# Patient Record
Sex: FEMALE | Race: WHITE | Employment: FULL TIME | URBAN - METROPOLITAN AREA
[De-identification: names, ages, dates, MRNs, and addresses within clinical notes are randomized per-mention and may not be internally consistent; named-entity substitution may affect disease eponyms.]

---

## 2021-10-23 ENCOUNTER — HOSPITAL ENCOUNTER (EMERGENCY)
Age: 36
Discharge: HOME | End: 2021-10-23
Attending: EMERGENCY MEDICINE
Payer: COMMERCIAL

## 2021-10-23 VITALS
DIASTOLIC BLOOD PRESSURE: 94 MMHG | TEMPERATURE: 97.8 F | WEIGHT: 230 LBS | BODY MASS INDEX: 34.97 KG/M2 | RESPIRATION RATE: 16 BRPM | HEART RATE: 88 BPM | SYSTOLIC BLOOD PRESSURE: 140 MMHG | OXYGEN SATURATION: 98 %

## 2021-10-23 DIAGNOSIS — H69.83 DYSFUNCTION OF BOTH EUSTACHIAN TUBES: ICD-10-CM

## 2021-10-23 DIAGNOSIS — H60.391 INFECTIVE OTITIS EXTERNA OF RIGHT EAR: Primary | ICD-10-CM

## 2021-10-23 PROCEDURE — 6360000002 HC RX W HCPCS: Performed by: EMERGENCY MEDICINE

## 2021-10-23 PROCEDURE — 99282 EMERGENCY DEPT VISIT SF MDM: CPT

## 2021-10-23 PROCEDURE — 96372 THER/PROPH/DIAG INJ SC/IM: CPT

## 2021-10-23 RX ORDER — ATORVASTATIN CALCIUM 20 MG/1
20 TABLET, FILM COATED ORAL DAILY
COMMUNITY

## 2021-10-23 RX ORDER — DEXAMETHASONE SODIUM PHOSPHATE 10 MG/ML
10 INJECTION, SOLUTION INTRAMUSCULAR; INTRAVENOUS ONCE
Status: COMPLETED | OUTPATIENT
Start: 2021-10-23 | End: 2021-10-23

## 2021-10-23 RX ORDER — AMLODIPINE BESYLATE 5 MG/1
5 TABLET ORAL DAILY
COMMUNITY

## 2021-10-23 RX ORDER — SULFAMETHOXAZOLE AND TRIMETHOPRIM 800; 160 MG/1; MG/1
1 TABLET ORAL 2 TIMES DAILY
Qty: 20 TABLET | Refills: 0 | Status: SHIPPED | OUTPATIENT
Start: 2021-10-23 | End: 2021-11-02

## 2021-10-23 RX ORDER — PROPRANOLOL HYDROCHLORIDE 120 MG/1
120 CAPSULE, EXTENDED RELEASE ORAL DAILY
COMMUNITY

## 2021-10-23 RX ORDER — DULOXETIN HYDROCHLORIDE 60 MG/1
60 CAPSULE, DELAYED RELEASE ORAL 2 TIMES DAILY
COMMUNITY

## 2021-10-23 RX ORDER — LISINOPRIL 20 MG/1
20 TABLET ORAL DAILY
COMMUNITY

## 2021-10-23 RX ORDER — NAPROXEN 500 MG/1
500 TABLET ORAL 2 TIMES DAILY
Qty: 14 TABLET | Refills: 0 | Status: SHIPPED | OUTPATIENT
Start: 2021-10-23 | End: 2021-10-30

## 2021-10-23 RX ADMIN — DEXAMETHASONE SODIUM PHOSPHATE 10 MG: 10 INJECTION, SOLUTION INTRAMUSCULAR; INTRAVENOUS at 18:36

## 2021-10-23 ASSESSMENT — PAIN DESCRIPTION - FREQUENCY: FREQUENCY: CONTINUOUS

## 2021-10-23 ASSESSMENT — ENCOUNTER SYMPTOMS
VOMITING: 0
EYE REDNESS: 0
NAUSEA: 0
EYE PAIN: 0
DIARRHEA: 0
BACK PAIN: 0
COUGH: 0
ABDOMINAL DISTENTION: 0
SHORTNESS OF BREATH: 0
EYE DISCHARGE: 0
SINUS PRESSURE: 0
SORE THROAT: 0
WHEEZING: 0
RHINORRHEA: 1

## 2021-10-23 ASSESSMENT — PAIN DESCRIPTION - ORIENTATION: ORIENTATION: RIGHT;LEFT

## 2021-10-23 ASSESSMENT — PAIN SCALES - GENERAL: PAINLEVEL_OUTOF10: 9

## 2021-10-23 ASSESSMENT — PAIN DESCRIPTION - PAIN TYPE: TYPE: ACUTE PAIN

## 2021-10-23 ASSESSMENT — PAIN DESCRIPTION - DESCRIPTORS: DESCRIPTORS: ACHING

## 2021-10-23 ASSESSMENT — PAIN DESCRIPTION - LOCATION: LOCATION: EAR

## 2021-10-23 NOTE — ED PROVIDER NOTES
9 day history of bilateral ear pain, MLP at her PCP office told her to use OTC Claritan and Sudafed; We discussed the use of Sudafed concurrently with hypertension diagnosis; She is a regular user of QTIPS to clean her ears    The history is provided by the patient and the spouse. Ear Problem  Location:  Bilateral  Behind ear:  No abnormality  Quality:  Aching and burning  Severity:  Mild  Onset quality:  Gradual  Timing:  Constant  Progression:  Worsening  Chronicity:  Recurrent  Context: water in ear    Relieved by:  Nothing  Worsened by:  Palpation, swallowing and position  Ineffective treatments:  None tried  Associated symptoms: congestion and rhinorrhea    Associated symptoms: no cough, no diarrhea, no fever, no headaches, no rash, no sore throat and no vomiting    Risk factors: chronic ear infection         Review of Systems   Constitutional: Positive for activity change. Negative for chills and fever. HENT: Positive for congestion, ear pain and rhinorrhea. Negative for sinus pressure and sore throat. Eyes: Negative for pain, discharge and redness. Respiratory: Negative for cough, shortness of breath and wheezing. Cardiovascular: Negative for chest pain. Gastrointestinal: Negative for abdominal distention, diarrhea, nausea and vomiting. Genitourinary: Negative for dysuria and frequency. Musculoskeletal: Negative for arthralgias and back pain. Skin: Negative for rash and wound. Neurological: Negative for weakness and headaches. Hematological: Negative for adenopathy. Psychiatric/Behavioral: Negative. All other systems reviewed and are negative. Physical Exam  Vitals and nursing note reviewed. Constitutional:       Appearance: She is well-developed. HENT:      Head: Normocephalic and atraumatic. Right Ear: Drainage, swelling and tenderness present. A middle ear effusion is present. Left Ear: A middle ear effusion is present.       Nose: Congestion and rhinorrhea present. Eyes:      Pupils: Pupils are equal, round, and reactive to light. Cardiovascular:      Rate and Rhythm: Normal rate and regular rhythm. Heart sounds: Normal heart sounds. No murmur heard. Pulmonary:      Effort: Pulmonary effort is normal.      Breath sounds: Normal breath sounds. Abdominal:      General: Bowel sounds are normal.      Palpations: Abdomen is soft. Tenderness: There is no abdominal tenderness. There is no guarding or rebound. Musculoskeletal:      Cervical back: Normal range of motion and neck supple. Skin:     General: Skin is warm and dry. Neurological:      Mental Status: She is alert and oriented to person, place, and time. Psychiatric:         Behavior: Behavior normal.         Thought Content: Thought content normal.         Judgment: Judgment normal.        --------------------------------------------- PAST HISTORY ---------------------------------------------  Past Medical History:  has a past medical history of Anxiety, Diabetes mellitus ([facility] [address] Fibromyalgia, Headache, Hyperlipidemia, Hypertension, and Obesity. Past Surgical History:  has a past surgical history that includes  section (, , ); Tubal ligation (); Hysterectomy (); bladder repair (); and Cholecystectomy (). Social History:  reports that she has never smoked. She does not have any smokeless tobacco history on file. She reports current alcohol use. She reports that she does not use drugs. Family History: family history includes Arthritis in her father and mother; Asthma in her brother; Cancer in her paternal grandfather; Diabetes in her maternal grandmother; Heart Disease in her father; High Blood Pressure in her maternal grandmother; Kidney Disease in her father; Other in her paternal grandmother. The patients home medications have been reviewed.     Allergies: Morphine, Reglan [metoclopramide], Cefdinir, and Compazine [prochlorperazine maleate]    -------------------------------------------------- RESULTS -------------------------------------------------  No results found for this visit on 10/23/21. No orders to display       ------------------------- NURSING NOTES AND VITALS REVIEWED ---------------------------   The nursing notes within the ED encounter and vital signs as below have been reviewed. BP (!) 140/94   Pulse 88   Temp 97.8 °F (36.6 °C) (Temporal)   Resp 16   Wt 230 lb (104.3 kg)   SpO2 98%   BMI 34.97 kg/m²   Oxygen Saturation Interpretation: Normal      ------------------------------------------ PROGRESS NOTES ------------------------------------------   I have spoken with the patient and discussed todays results, in addition to providing specific details for the plan of care and counseling regarding the diagnosis and prognosis. Their questions are answered at this time and they are agreeable with the plan.      --------------------------------- ADDITIONAL PROVIDER NOTES ---------------------------------        This patient is stable for discharge. I have shared the specific conditions for return, as well as the importance of follow-up. IMPRESSION:     1. Infective otitis externa of right ear    2.  Dysfunction of both eustachian tubes      Discharge Medication List as of 10/23/2021  6:35 PM      START taking these medications    Details   neomycin-polymyxin-hydrocortisone (CORTISPORIN) 3.5-87735-2 otic solution Place 4 drops into the right ear 4 times daily for 10 days, Disp-10 mL, R-0Print      sulfamethoxazole-trimethoprim (BACTRIM DS) 800-160 MG per tablet Take 1 tablet by mouth 2 times daily for 10 days, Disp-20 tablet, R-0Print         CONTINUE these medications which have CHANGED    Details   naproxen (NAPROSYN) 500 MG tablet Take 1 tablet by mouth 2 times daily for 7 days, Disp-14 tablet, R-0Print         CONTINUE these medications which have NOT CHANGED    Details   DULoxetine (CYMBALTA) 60

## 2023-03-16 DIAGNOSIS — I10 HYPERTENSION, UNSPECIFIED TYPE: Primary | ICD-10-CM

## 2023-03-16 RX ORDER — LOSARTAN POTASSIUM 100 MG/1
TABLET ORAL
Qty: 30 TABLET | Refills: 5 | Status: SHIPPED | OUTPATIENT
Start: 2023-03-16 | End: 2023-09-19 | Stop reason: SDUPTHER

## 2023-05-04 ENCOUNTER — APPOINTMENT (OUTPATIENT)
Dept: PRIMARY CARE | Facility: CLINIC | Age: 38
End: 2023-05-04
Payer: COMMERCIAL

## 2023-06-14 ENCOUNTER — TELEPHONE (OUTPATIENT)
Dept: PRIMARY CARE | Facility: CLINIC | Age: 38
End: 2023-06-14
Payer: COMMERCIAL

## 2023-06-14 DIAGNOSIS — I10 BENIGN ESSENTIAL HYPERTENSION: Primary | ICD-10-CM

## 2023-06-14 RX ORDER — AMLODIPINE BESYLATE 5 MG/1
5 TABLET ORAL DAILY
COMMUNITY
End: 2023-06-14 | Stop reason: SDUPTHER

## 2023-06-14 RX ORDER — AMLODIPINE BESYLATE 5 MG/1
5 TABLET ORAL DAILY
Qty: 90 TABLET | Refills: 3 | Status: SHIPPED | OUTPATIENT
Start: 2023-06-14 | End: 2023-11-28 | Stop reason: SDUPTHER

## 2023-06-17 DIAGNOSIS — E11.9 TYPE 2 DIABETES MELLITUS WITHOUT COMPLICATION, WITH LONG-TERM CURRENT USE OF INSULIN (MULTI): Primary | ICD-10-CM

## 2023-06-17 DIAGNOSIS — Z79.4 TYPE 2 DIABETES MELLITUS WITHOUT COMPLICATION, WITH LONG-TERM CURRENT USE OF INSULIN (MULTI): Primary | ICD-10-CM

## 2023-06-21 RX ORDER — CALCIUM CITRATE/VITAMIN D3 200MG-6.25
TABLET ORAL
Qty: 100 STRIP | Refills: 5 | Status: SHIPPED | OUTPATIENT
Start: 2023-06-21

## 2023-07-18 ENCOUNTER — HOSPITAL ENCOUNTER (OUTPATIENT)
Dept: DATA CONVERSION | Facility: HOSPITAL | Age: 38
End: 2023-07-18
Attending: INTERNAL MEDICINE | Admitting: INTERNAL MEDICINE
Payer: COMMERCIAL

## 2023-07-18 DIAGNOSIS — K57.30 DIVERTICULOSIS OF LARGE INTESTINE WITHOUT PERFORATION OR ABSCESS WITHOUT BLEEDING: ICD-10-CM

## 2023-07-18 DIAGNOSIS — R19.4 CHANGE IN BOWEL HABIT: ICD-10-CM

## 2023-07-18 DIAGNOSIS — R10.30 LOWER ABDOMINAL PAIN, UNSPECIFIED: ICD-10-CM

## 2023-07-18 DIAGNOSIS — K29.50 UNSPECIFIED CHRONIC GASTRITIS WITHOUT BLEEDING: ICD-10-CM

## 2023-07-18 DIAGNOSIS — K64.9 UNSPECIFIED HEMORRHOIDS: ICD-10-CM

## 2023-07-18 DIAGNOSIS — R10.13 EPIGASTRIC PAIN: ICD-10-CM

## 2023-07-18 DIAGNOSIS — K21.9 GASTRO-ESOPHAGEAL REFLUX DISEASE WITHOUT ESOPHAGITIS: ICD-10-CM

## 2023-07-18 DIAGNOSIS — K59.00 CONSTIPATION, UNSPECIFIED: ICD-10-CM

## 2023-07-21 ENCOUNTER — TELEPHONE (OUTPATIENT)
Dept: PRIMARY CARE | Facility: CLINIC | Age: 38
End: 2023-07-21
Payer: COMMERCIAL

## 2023-07-21 DIAGNOSIS — F41.9 ANXIETY: ICD-10-CM

## 2023-07-21 DIAGNOSIS — I10 BENIGN ESSENTIAL HYPERTENSION: ICD-10-CM

## 2023-07-21 RX ORDER — PROPRANOLOL HYDROCHLORIDE 160 MG/1
CAPSULE, EXTENDED RELEASE ORAL
Qty: 30 CAPSULE | Refills: 11 | Status: SHIPPED | OUTPATIENT
Start: 2023-07-21 | End: 2023-11-28 | Stop reason: SDUPTHER

## 2023-07-21 RX ORDER — DULOXETIN HYDROCHLORIDE 60 MG/1
CAPSULE, DELAYED RELEASE ORAL
Qty: 60 CAPSULE | Refills: 11 | Status: SHIPPED | OUTPATIENT
Start: 2023-07-21 | End: 2023-11-28 | Stop reason: SDUPTHER

## 2023-07-21 NOTE — TELEPHONE ENCOUNTER
Duloxetine   And propanolol   [facility] [facility]   Has been out for 2 days   Says she called on Monday but I don't see it documented     [phone]

## 2023-07-24 LAB
COMPLETE PATHOLOGY REPORT: NORMAL
CONVERTED CLINICAL DIAGNOSIS-HISTORY: NORMAL
CONVERTED FINAL DIAGNOSIS: NORMAL
CONVERTED FINAL REPORT PDF LINK TO COPY AND PASTE: NORMAL
CONVERTED GROSS DESCRIPTION: NORMAL
CONVERTED MICROSCOPIC DESCRIPTION: NORMAL

## 2023-09-19 DIAGNOSIS — I10 HYPERTENSION, UNSPECIFIED TYPE: ICD-10-CM

## 2023-09-19 RX ORDER — LOSARTAN POTASSIUM 100 MG/1
TABLET ORAL
Qty: 30 TABLET | Refills: 5 | Status: SHIPPED | OUTPATIENT
Start: 2023-09-19 | End: 2024-02-02 | Stop reason: SDUPTHER

## 2023-09-29 VITALS — BODY MASS INDEX: 35.47 KG/M2 | HEIGHT: 67 IN | WEIGHT: 225.97 LBS

## 2023-10-05 ENCOUNTER — HOSPITAL ENCOUNTER (OUTPATIENT)
Dept: RADIOLOGY | Facility: HOSPITAL | Age: 38
Discharge: HOME | End: 2023-10-05
Payer: COMMERCIAL

## 2023-10-05 DIAGNOSIS — N64.59 OTHER SIGNS AND SYMPTOMS IN BREAST: ICD-10-CM

## 2023-10-05 PROCEDURE — 77061 BREAST TOMOSYNTHESIS UNI: CPT | Mod: RT

## 2023-10-05 PROCEDURE — 77065 DX MAMMO INCL CAD UNI: CPT | Mod: RIGHT SIDE

## 2023-10-05 PROCEDURE — G0279 TOMOSYNTHESIS, MAMMO: HCPCS | Mod: RIGHT SIDE

## 2023-10-06 DIAGNOSIS — E11.9 TYPE 2 DIABETES MELLITUS WITHOUT COMPLICATION, UNSPECIFIED WHETHER LONG TERM INSULIN USE (MULTI): Primary | ICD-10-CM

## 2023-10-06 RX ORDER — TIRZEPATIDE 12.5 MG/.5ML
12.5 INJECTION, SOLUTION SUBCUTANEOUS
Qty: 2 ML | Refills: 3 | Status: SHIPPED | OUTPATIENT
Start: 2023-10-06 | End: 2023-12-12 | Stop reason: ALTCHOICE

## 2023-10-06 RX ORDER — TIRZEPATIDE 12.5 MG/.5ML
INJECTION, SOLUTION SUBCUTANEOUS
COMMUNITY
Start: 2023-09-05 | End: 2023-10-06 | Stop reason: SDUPTHER

## 2023-10-29 ENCOUNTER — HOSPITAL ENCOUNTER (EMERGENCY)
Age: 38
Discharge: HOME | End: 2023-10-29
Attending: FAMILY MEDICINE
Payer: COMMERCIAL

## 2023-10-29 VITALS
RESPIRATION RATE: 16 BRPM | WEIGHT: 225 LBS | TEMPERATURE: 98.6 F | BODY MASS INDEX: 34.1 KG/M2 | HEART RATE: 96 BPM | DIASTOLIC BLOOD PRESSURE: 93 MMHG | OXYGEN SATURATION: 99 % | SYSTOLIC BLOOD PRESSURE: 135 MMHG | HEIGHT: 68 IN

## 2023-10-29 DIAGNOSIS — M26.629 TMJ SYNDROME: Primary | ICD-10-CM

## 2023-10-29 PROCEDURE — 96372 THER/PROPH/DIAG INJ SC/IM: CPT

## 2023-10-29 PROCEDURE — 6360000002 HC RX W HCPCS: Performed by: FAMILY MEDICINE

## 2023-10-29 PROCEDURE — 99284 EMERGENCY DEPT VISIT MOD MDM: CPT

## 2023-10-29 RX ORDER — ATORVASTATIN CALCIUM 40 MG/1
TABLET, FILM COATED ORAL
COMMUNITY
Start: 2023-10-07

## 2023-10-29 RX ORDER — TIRZEPATIDE 12.5 MG/.5ML
12.5 INJECTION, SOLUTION SUBCUTANEOUS WEEKLY
Qty: 2 ML | Refills: 9 | COMMUNITY
Start: 2023-03-29 | End: 2024-01-20

## 2023-10-29 RX ORDER — PREDNISONE 20 MG/1
40 TABLET ORAL DAILY
Qty: 8 TABLET | Refills: 0 | Status: SHIPPED | OUTPATIENT
Start: 2023-10-29 | End: 2023-11-02

## 2023-10-29 RX ORDER — FENOFIBRATE 145 MG/1
TABLET, COATED ORAL
COMMUNITY
Start: 2023-10-11

## 2023-10-29 RX ORDER — INSULIN HUMAN 500 [IU]/ML
INJECTION, SOLUTION SUBCUTANEOUS
COMMUNITY
Start: 2023-09-29

## 2023-10-29 RX ORDER — LINACLOTIDE 145 UG/1
145 CAPSULE, GELATIN COATED ORAL DAILY
COMMUNITY
Start: 2023-09-22

## 2023-10-29 RX ORDER — NAPROXEN 500 MG/1
500 TABLET ORAL 2 TIMES DAILY
Qty: 20 TABLET | Refills: 0 | Status: SHIPPED | OUTPATIENT
Start: 2023-10-29 | End: 2023-11-08

## 2023-10-29 RX ORDER — PANTOPRAZOLE SODIUM 40 MG/1
40 TABLET, DELAYED RELEASE ORAL 2 TIMES DAILY
COMMUNITY
Start: 2023-10-12

## 2023-10-29 RX ORDER — GLIMEPIRIDE 2 MG/1
TABLET ORAL
COMMUNITY
Start: 2020-12-23

## 2023-10-29 RX ORDER — DEXAMETHASONE SODIUM PHOSPHATE 10 MG/ML
8 INJECTION, SOLUTION INTRAMUSCULAR; INTRAVENOUS ONCE
Status: COMPLETED | OUTPATIENT
Start: 2023-10-29 | End: 2023-10-29

## 2023-10-29 RX ORDER — KETOROLAC TROMETHAMINE 30 MG/ML
30 INJECTION, SOLUTION INTRAMUSCULAR; INTRAVENOUS ONCE
Status: COMPLETED | OUTPATIENT
Start: 2023-10-29 | End: 2023-10-29

## 2023-10-29 RX ORDER — LOSARTAN POTASSIUM 100 MG/1
TABLET ORAL
COMMUNITY
Start: 2023-10-12

## 2023-10-29 RX ADMIN — KETOROLAC TROMETHAMINE 30 MG: 30 INJECTION, SOLUTION INTRAMUSCULAR; INTRAVENOUS at 14:21

## 2023-10-29 RX ADMIN — DEXAMETHASONE SODIUM PHOSPHATE 8 MG: 10 INJECTION, SOLUTION INTRAMUSCULAR; INTRAVENOUS at 14:21

## 2023-11-17 ENCOUNTER — TELEPHONE (OUTPATIENT)
Dept: PRIMARY CARE | Facility: CLINIC | Age: 38
End: 2023-11-17
Payer: COMMERCIAL

## 2023-11-17 DIAGNOSIS — E78.5 HYPERLIPIDEMIA, UNSPECIFIED HYPERLIPIDEMIA TYPE: ICD-10-CM

## 2023-11-17 PROBLEM — R53.82 CHRONIC FATIGUE AND MALAISE: Status: ACTIVE | Noted: 2023-11-17

## 2023-11-17 PROBLEM — E11.65 UNCONTROLLED TYPE 2 DIABETES MELLITUS WITH HYPERGLYCEMIA (MULTI): Status: ACTIVE | Noted: 2023-11-17

## 2023-11-17 PROBLEM — K59.00 CONSTIPATION, UNSPECIFIED: Status: ACTIVE | Noted: 2023-11-17

## 2023-11-17 PROBLEM — S42.025A CLOSED NONDISPLACED FRACTURE OF SHAFT OF LEFT CLAVICLE: Status: ACTIVE | Noted: 2023-11-17

## 2023-11-17 PROBLEM — J02.9 SORE THROAT: Status: ACTIVE | Noted: 2023-11-17

## 2023-11-17 PROBLEM — S14.3XXA INJURY OF BRACHIAL PLEXUS: Status: ACTIVE | Noted: 2023-11-17

## 2023-11-17 PROBLEM — R06.02 SHORTNESS OF BREATH ON EXERTION: Status: ACTIVE | Noted: 2023-11-17

## 2023-11-17 PROBLEM — E78.1 HYPERTRIGLYCERIDEMIA: Status: ACTIVE | Noted: 2023-11-17

## 2023-11-17 PROBLEM — N39.0 ACUTE UTI: Status: ACTIVE | Noted: 2023-11-17

## 2023-11-17 PROBLEM — L91.8 CUTANEOUS SKIN TAGS: Status: ACTIVE | Noted: 2023-11-17

## 2023-11-17 PROBLEM — R53.81 CHRONIC FATIGUE AND MALAISE: Status: ACTIVE | Noted: 2023-11-17

## 2023-11-17 PROBLEM — I83.93 VARICOSE VEINS OF BOTH LOWER EXTREMITIES: Status: ACTIVE | Noted: 2023-11-17

## 2023-11-17 PROBLEM — G54.0 THORACIC OUTLET SYNDROME: Status: ACTIVE | Noted: 2023-11-17

## 2023-11-17 PROBLEM — M41.34 THORACOGENIC SCOLIOSIS OF THORACIC REGION: Status: ACTIVE | Noted: 2023-11-17

## 2023-11-17 PROBLEM — K58.9 IRRITABLE BOWEL SYNDROME: Status: ACTIVE | Noted: 2023-11-17

## 2023-11-17 PROBLEM — R25.2 MUSCLE CRAMPS: Status: ACTIVE | Noted: 2023-11-17

## 2023-11-17 PROBLEM — E55.9 VITAMIN D DEFICIENCY: Status: ACTIVE | Noted: 2023-11-17

## 2023-11-17 PROBLEM — K59.09 INTERMITTENT CONSTIPATION: Status: ACTIVE | Noted: 2023-11-17

## 2023-11-17 PROBLEM — E78.2 COMBINED HYPERLIPIDEMIA: Status: ACTIVE | Noted: 2023-11-17

## 2023-11-17 PROBLEM — R25.2 BILATERAL LEG CRAMPS: Status: ACTIVE | Noted: 2023-11-17

## 2023-11-17 PROBLEM — R10.2 SUPRAPUBIC DISCOMFORT: Status: ACTIVE | Noted: 2023-11-17

## 2023-11-17 PROBLEM — M25.69 DECREASED RANGE OF MOTION OF TRUNK AND BACK: Status: ACTIVE | Noted: 2023-11-17

## 2023-11-17 PROBLEM — I83.90 VARICOSITIES OF LEG: Status: ACTIVE | Noted: 2023-11-17

## 2023-11-17 PROBLEM — M54.2 NECK PAIN, CHRONIC: Status: ACTIVE | Noted: 2023-11-17

## 2023-11-17 PROBLEM — R11.2 NAUSEA AND VOMITING: Status: ACTIVE | Noted: 2023-11-17

## 2023-11-17 PROBLEM — R05.9 COUGH: Status: ACTIVE | Noted: 2023-11-17

## 2023-11-17 PROBLEM — I10 HYPERTENSION: Status: ACTIVE | Noted: 2022-06-17

## 2023-11-17 PROBLEM — E53.8 VITAMIN B12 DEFICIENCY: Status: ACTIVE | Noted: 2023-11-17
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
## 2023-11-17 PROBLEM — Z20.822 EXPOSURE TO COVID-19 VIRUS: Status: ACTIVE | Noted: 2023-11-17

## 2023-11-17 PROBLEM — M54.16 LUMBAR RADICULOPATHY: Status: ACTIVE | Noted: 2023-11-17

## 2023-11-17 PROBLEM — R31.9 HEMATURIA: Status: ACTIVE | Noted: 2023-11-17

## 2023-11-17 PROBLEM — R07.89 ATYPICAL CHEST PAIN: Status: ACTIVE | Noted: 2023-11-17

## 2023-11-17 PROBLEM — M54.50 BACK PAIN, LUMBOSACRAL: Status: ACTIVE | Noted: 2023-11-17

## 2023-11-17 PROBLEM — R70.0 ELEVATED SED RATE: Status: ACTIVE | Noted: 2023-11-17

## 2023-11-17 PROBLEM — R11.0 NAUSEA: Status: ACTIVE | Noted: 2023-11-17

## 2023-11-17 PROBLEM — G62.9 PERIPHERAL POLYNEUROPATHY: Status: ACTIVE | Noted: 2023-11-17

## 2023-11-17 PROBLEM — F41.8 DEPRESSION WITH ANXIETY: Status: ACTIVE | Noted: 2023-11-17

## 2023-11-17 PROBLEM — R10.9 LEFT FLANK PAIN: Status: ACTIVE | Noted: 2023-11-17

## 2023-11-17 PROBLEM — R60.0 PERIPHERAL EDEMA: Status: ACTIVE | Noted: 2023-11-17

## 2023-11-17 PROBLEM — U07.1 COVID-19 VIRUS INFECTION: Status: ACTIVE | Noted: 2023-11-17

## 2023-11-17 PROBLEM — R60.9 PERIPHERAL EDEMA: Status: ACTIVE | Noted: 2023-11-17

## 2023-11-17 PROBLEM — R00.0 TACHYCARDIA: Status: ACTIVE | Noted: 2018-11-16

## 2023-11-17 PROBLEM — M79.10 MUSCULAR PAIN: Status: ACTIVE | Noted: 2023-11-17

## 2023-11-17 PROBLEM — R29.818 LHERMITTE'S SIGN POSITIVE: Status: ACTIVE | Noted: 2023-11-17

## 2023-11-17 PROBLEM — F33.9 RECURRENT MAJOR DEPRESSIVE EPISODES (CMS-HCC): Status: ACTIVE | Noted: 2018-11-16

## 2023-11-17 PROBLEM — R00.2 PALPITATIONS: Status: ACTIVE | Noted: 2023-11-17

## 2023-11-17 PROBLEM — R06.00 DYSPNEA: Status: ACTIVE | Noted: 2023-11-17

## 2023-11-17 PROBLEM — M25.512 LEFT SHOULDER PAIN: Status: ACTIVE | Noted: 2023-11-17

## 2023-11-17 PROBLEM — M79.18 MUSCULOSKELETAL PAIN: Status: ACTIVE | Noted: 2023-11-17

## 2023-11-17 PROBLEM — M62.838 MUSCLE SPASM: Status: ACTIVE | Noted: 2023-11-17

## 2023-11-17 PROBLEM — R10.13 EPIGASTRIC PAIN: Status: ACTIVE | Noted: 2023-11-17

## 2023-11-17 PROBLEM — M89.8X1: Status: ACTIVE | Noted: 2023-11-17

## 2023-11-17 PROBLEM — G35 MULTIPLE SCLEROSIS (MULTI): Status: ACTIVE | Noted: 2023-11-17

## 2023-11-17 PROBLEM — G47.19 DAYTIME HYPERSOMNOLENCE: Status: ACTIVE | Noted: 2023-11-17

## 2023-11-17 PROBLEM — M75.112 NONTRAUMATIC INCOMPLETE TEAR OF LEFT ROTATOR CUFF: Status: ACTIVE | Noted: 2023-11-17

## 2023-11-17 PROBLEM — J18.9 HAP (HOSPITAL-ACQUIRED PNEUMONIA): Status: ACTIVE | Noted: 2023-11-17

## 2023-11-17 PROBLEM — R53.83 FATIGUE: Status: ACTIVE | Noted: 2023-11-17

## 2023-11-17 PROBLEM — R94.5 ABNORMAL RESULTS OF LIVER FUNCTION STUDIES: Status: ACTIVE | Noted: 2018-11-29

## 2023-11-17 PROBLEM — G54.0 BRACHIAL PLEXUS DYSFUNCTION: Status: ACTIVE | Noted: 2023-11-17

## 2023-11-17 PROBLEM — M54.12 CERVICAL RADICULOPATHY: Status: ACTIVE | Noted: 2023-11-17

## 2023-11-17 PROBLEM — R14.0 POSTPRANDIAL BLOATING: Status: ACTIVE | Noted: 2023-11-17

## 2023-11-17 PROBLEM — R19.7 DIARRHEA: Status: ACTIVE | Noted: 2023-11-17

## 2023-11-17 PROBLEM — G89.29 NECK PAIN, CHRONIC: Status: ACTIVE | Noted: 2023-11-17

## 2023-11-17 PROBLEM — E66.9 OBESITY: Status: ACTIVE | Noted: 2023-11-17

## 2023-11-17 RX ORDER — ATORVASTATIN CALCIUM 40 MG/1
TABLET, FILM COATED ORAL
COMMUNITY
Start: 2020-10-15 | End: 2023-11-17 | Stop reason: SDUPTHER

## 2023-11-17 RX ORDER — ATORVASTATIN CALCIUM 40 MG/1
TABLET, FILM COATED ORAL
Qty: 30 TABLET | Refills: 0 | Status: SHIPPED | OUTPATIENT
Start: 2023-11-17 | End: 2023-11-28 | Stop reason: SDUPTHER

## 2023-11-20 DIAGNOSIS — E11.9 TYPE 2 DIABETES MELLITUS WITHOUT COMPLICATION, WITH LONG-TERM CURRENT USE OF INSULIN (MULTI): Primary | ICD-10-CM

## 2023-11-20 DIAGNOSIS — Z79.4 TYPE 2 DIABETES MELLITUS WITHOUT COMPLICATION, WITH LONG-TERM CURRENT USE OF INSULIN (MULTI): Primary | ICD-10-CM

## 2023-11-20 RX ORDER — INSULIN HUMAN 500 [IU]/ML
INJECTION, SOLUTION SUBCUTANEOUS
COMMUNITY
Start: 2021-11-04 | End: 2023-11-20 | Stop reason: SDUPTHER

## 2023-11-20 NOTE — TELEPHONE ENCOUNTER
Patient called to request renewal; does not have enough insulin to make it to follow up appointment.

## 2023-11-21 RX ORDER — INSULIN HUMAN 500 [IU]/ML
INJECTION, SOLUTION SUBCUTANEOUS
Qty: 3 ML | Refills: 3 | Status: SHIPPED | OUTPATIENT
Start: 2023-11-21 | End: 2023-12-12 | Stop reason: SDUPTHER

## 2023-11-28 DIAGNOSIS — F41.9 ANXIETY: ICD-10-CM

## 2023-11-28 DIAGNOSIS — E78.5 HYPERLIPIDEMIA, UNSPECIFIED HYPERLIPIDEMIA TYPE: ICD-10-CM

## 2023-11-28 DIAGNOSIS — I10 BENIGN ESSENTIAL HYPERTENSION: ICD-10-CM

## 2023-11-28 RX ORDER — DULOXETIN HYDROCHLORIDE 60 MG/1
CAPSULE, DELAYED RELEASE ORAL
Qty: 60 CAPSULE | Refills: 11 | Status: SHIPPED | OUTPATIENT
Start: 2023-11-28

## 2023-11-28 RX ORDER — AMLODIPINE BESYLATE 5 MG/1
5 TABLET ORAL DAILY
Qty: 90 TABLET | Refills: 3 | Status: SHIPPED | OUTPATIENT
Start: 2023-11-28 | End: 2024-11-27

## 2023-11-28 RX ORDER — PROPRANOLOL HYDROCHLORIDE 160 MG/1
CAPSULE, EXTENDED RELEASE ORAL
Qty: 30 CAPSULE | Refills: 11 | Status: SHIPPED | OUTPATIENT
Start: 2023-11-28

## 2023-11-28 RX ORDER — ATORVASTATIN CALCIUM 40 MG/1
TABLET, FILM COATED ORAL
Qty: 90 TABLET | Refills: 3 | Status: SHIPPED | OUTPATIENT
Start: 2023-11-28

## 2023-12-12 ENCOUNTER — OFFICE VISIT (OUTPATIENT)
Dept: ENDOCRINOLOGY | Facility: CLINIC | Age: 38
End: 2023-12-12
Payer: COMMERCIAL

## 2023-12-12 ENCOUNTER — APPOINTMENT (OUTPATIENT)
Dept: PRIMARY CARE | Facility: CLINIC | Age: 38
End: 2023-12-12
Payer: COMMERCIAL

## 2023-12-12 VITALS
SYSTOLIC BLOOD PRESSURE: 136 MMHG | DIASTOLIC BLOOD PRESSURE: 88 MMHG | HEIGHT: 68 IN | WEIGHT: 229 LBS | HEART RATE: 88 BPM | BODY MASS INDEX: 34.71 KG/M2

## 2023-12-12 DIAGNOSIS — E11.9 TYPE 2 DIABETES MELLITUS WITHOUT COMPLICATION, WITH LONG-TERM CURRENT USE OF INSULIN (MULTI): ICD-10-CM

## 2023-12-12 DIAGNOSIS — E11.9 TYPE 2 DIABETES MELLITUS WITHOUT COMPLICATION, UNSPECIFIED WHETHER LONG TERM INSULIN USE (MULTI): Primary | ICD-10-CM

## 2023-12-12 DIAGNOSIS — Z79.4 TYPE 2 DIABETES MELLITUS WITHOUT COMPLICATION, WITH LONG-TERM CURRENT USE OF INSULIN (MULTI): ICD-10-CM

## 2023-12-12 LAB — POC HEMOGLOBIN A1C: 6.7 % (ref 4.2–6.5)

## 2023-12-12 PROCEDURE — 4010F ACE/ARB THERAPY RXD/TAKEN: CPT | Performed by: INTERNAL MEDICINE

## 2023-12-12 PROCEDURE — 3052F HG A1C>EQUAL 8.0%<EQUAL 9.0%: CPT | Performed by: INTERNAL MEDICINE

## 2023-12-12 PROCEDURE — 99214 OFFICE O/P EST MOD 30 MIN: CPT | Performed by: INTERNAL MEDICINE

## 2023-12-12 PROCEDURE — 83036 HEMOGLOBIN GLYCOSYLATED A1C: CPT | Performed by: INTERNAL MEDICINE

## 2023-12-12 PROCEDURE — 3079F DIAST BP 80-89 MM HG: CPT | Performed by: INTERNAL MEDICINE

## 2023-12-12 PROCEDURE — 1036F TOBACCO NON-USER: CPT | Performed by: INTERNAL MEDICINE

## 2023-12-12 PROCEDURE — 3075F SYST BP GE 130 - 139MM HG: CPT | Performed by: INTERNAL MEDICINE

## 2023-12-12 RX ORDER — INSULIN HUMAN 500 [IU]/ML
INJECTION, SOLUTION SUBCUTANEOUS
Qty: 3 EACH | Refills: 3 | Status: SHIPPED | OUTPATIENT
Start: 2023-12-12

## 2023-12-12 RX ORDER — TIRZEPATIDE 12.5 MG/.5ML
12.5 INJECTION, SOLUTION SUBCUTANEOUS
Qty: 2 ML | Refills: 3 | Status: CANCELLED | OUTPATIENT
Start: 2023-12-12 | End: 2024-03-27

## 2023-12-12 RX ORDER — TIRZEPATIDE 15 MG/.5ML
15 INJECTION, SOLUTION SUBCUTANEOUS
Qty: 2 ML | Refills: 5 | Status: SHIPPED | OUTPATIENT
Start: 2023-12-12

## 2023-12-12 RX ORDER — INSULIN HUMAN 500 [IU]/ML
INJECTION, SOLUTION SUBCUTANEOUS
Qty: 3 ML | Refills: 3 | Status: SHIPPED | OUTPATIENT
Start: 2023-12-12 | End: 2023-12-12 | Stop reason: SDUPTHER

## 2023-12-12 NOTE — PROGRESS NOTES
"Subjective   Denise Kurtz is a 38 y.o. female who presents for follow-up of Type 2 diabetes mellitus. The initial diagnosis of diabetes was made in 2013 .     She has had no major changes to her health since her last appointment.  She has been having kidney pain but testing was negartive    Her previous GI issues have resolved.  She was started on Pantoprazole and FD guard. However, she is only now using FD guard twice weekly.  She has been having two bowel movements per day.  Her nausea has resolved.      She has received Prednisone x 3 for TMJ - two oral and 1 injection     Known complications due to diabetes included none    Cardiovascular risk factors include diabetes mellitus and obesity (BMI >= 30 kg/m2). The patient is on an ACE inhibitor or angiotensin II receptor blocker.  The patient has not been previously hospitalized due to diabetic ketoacidosis.     Current symptoms/problems include none. Her clinical course has been stable.     The patient is currently checking the blood glucose.      Patient is using: glucometer  150mg/dL range     Hypoglycemia frequency: 44mg/dL   Hypoglycemia awareness: Yes     Current Medication Regimen  Humulin U500 105 in the morning  Mounjaro 12.5mg SQ once weekly   Metformin 500mg twice daily - once 3-4 times per week      She denies GDM with three children (two babies over 9 lbs)     MEALS:  Breakfast - yogurt, avocado toast, egg with cheddar cheese, occasional oatmeal   Lunch - can omit; canned tuna, toast  Dinner - chicken and broccoli   Beverages - coffee, water, Vitamin water     Exercise regimen - walks dog      Family history  Father- type 2 DM       Objective   /88 (BP Location: Right arm, Patient Position: Sitting, BP Cuff Size: Adult)   Pulse 88   Ht 1.727 m (5' 8\")   Wt 104 kg (229 lb)   BMI 34.82 kg/m²   Physical Exam  Vitals and nursing note reviewed.   Constitutional:       General: She is not in acute distress.     Appearance: Normal appearance. She " "is obese.   HENT:      Head: Normocephalic and atraumatic.      Nose: Nose normal.      Mouth/Throat:      Mouth: Mucous membranes are moist.   Eyes:      Extraocular Movements: Extraocular movements intact.   Cardiovascular:      Rate and Rhythm: Normal rate and regular rhythm.   Pulmonary:      Effort: Pulmonary effort is normal.      Breath sounds: Normal breath sounds.   Musculoskeletal:         General: Normal range of motion.   Skin:     General: Skin is warm.   Neurological:      Mental Status: She is alert and oriented to person, place, and time.   Psychiatric:         Mood and Affect: Mood normal.         Lab Review  Glucose (mg/dL)   Date Value   05/22/2023 117 (H)   02/08/2023 68 (L)   10/31/2022 187 (H)     POC HEMOGLOBIN A1c (%)   Date Value   12/12/2023 6.7 (A)     Hemoglobin A1C (%)   Date Value   02/08/2023 8.4 (A)   10/31/2022 8.1 (A)   06/22/2022 9.0 (A)     Bicarbonate (mmol/L)   Date Value   05/22/2023 25   02/08/2023 28   10/31/2022 25     Urea Nitrogen (mg/dL)   Date Value   05/22/2023 9   02/08/2023 11   10/31/2022 12     Creatinine (mg/dL)   Date Value   05/22/2023 0.76   02/08/2023 0.69   10/31/2022 0.67     Lab Results   Component Value Date    CHOL 148 02/08/2023    CHOL 175 10/31/2022    CHOL 207 (H) 06/22/2022     Lab Results   Component Value Date    HDL 29.9 (A) 02/08/2023    HDL 28.8 (A) 10/31/2022    HDL 33.7 (A) 06/22/2022     No results found for: \"LDLCALC\"  Lab Results   Component Value Date    TRIG 227 (H) 02/08/2023    TRIG 382 (H) 10/31/2022    TRIG 1,052 (H) 06/22/2022       Health Maintenance:   Foot Exam: June 20, 2023  Eye Exam:  July 11, 2022  Urine Albumin:  February 8, 2023    Assessment/Plan   38-year-old female presents for follow up for type 2 diabetes. Her last hemoglobin A1c has improved to 6.7% as of today. Her blood pressure is at goal today. She is noted to be on a statin. She is noted to be on an ACE inhibitor     Type 2 diabetes mellitus (CMS/[facility])  To continue " Humulin R U500 105 units SQ before breakfast  To increase Mounjaro to 15 mg SQ once weekly   To continue Metformin 500mg twice daily   Counseled to adopt an exercise regimen to include 150 minutes of moderate intensity exercise per week of moderate intensity    The A1C is at goal   Please stick to a low carb diet        Long-term insulin use (CMS/HCC)  Please rotate insulin injection sites    For follow up in 6 months

## 2023-12-12 NOTE — PATIENT INSTRUCTIONS
Thank you for choosing [facility]  for your health care needs.  If you have any questions, concerns or medical needs, please feel free to contact our office at [phone].    Please ensure you complete your blood work one week before the next scheduled appointment.    To continue Humulin R U500 105 units SQ before breakfast  To increase Mounjaro to 15 mg SQ once weekly   To continue Metformin 500mg twice daily   Counseled to adopt an exercise regimen to include 150 minutes of moderate intensity exercise per week of moderate intensity    The A1C is at goal   For follow up in 6 months

## 2023-12-17 PROBLEM — Z79.4 LONG-TERM INSULIN USE (MULTI): Status: ACTIVE | Noted: 2023-12-17

## 2023-12-17 NOTE — ASSESSMENT & PLAN NOTE
To continue Humulin R U500 105 units SQ before breakfast  To increase Mounjaro to 15 mg SQ once weekly   To continue Metformin 500mg twice daily   Counseled to adopt an exercise regimen to include 150 minutes of moderate intensity exercise per week of moderate intensity    The A1C is at goal   Please stick to a low carb diet

## 2023-12-29 ENCOUNTER — APPOINTMENT (OUTPATIENT)
Dept: PRIMARY CARE | Facility: CLINIC | Age: 38
End: 2023-12-29
Payer: COMMERCIAL

## 2024-01-03 ENCOUNTER — OFFICE VISIT (OUTPATIENT)
Dept: PRIMARY CARE | Facility: CLINIC | Age: 39
End: 2024-01-03
Payer: COMMERCIAL

## 2024-01-03 ENCOUNTER — PATIENT MESSAGE (OUTPATIENT)
Dept: PRIMARY CARE | Facility: CLINIC | Age: 39
End: 2024-01-03

## 2024-01-03 VITALS
SYSTOLIC BLOOD PRESSURE: 129 MMHG | DIASTOLIC BLOOD PRESSURE: 85 MMHG | WEIGHT: 231 LBS | HEART RATE: 84 BPM | BODY MASS INDEX: 35.01 KG/M2 | HEIGHT: 68 IN

## 2024-01-03 DIAGNOSIS — R30.0 DYSURIA: Primary | ICD-10-CM

## 2024-01-03 DIAGNOSIS — Z13.220 LIPID SCREENING: Primary | ICD-10-CM

## 2024-01-03 DIAGNOSIS — R30.0 DYSURIA: ICD-10-CM

## 2024-01-03 DIAGNOSIS — R31.29 OTHER MICROSCOPIC HEMATURIA: ICD-10-CM

## 2024-01-03 DIAGNOSIS — N20.0 RENAL CALCULI: ICD-10-CM

## 2024-01-03 DIAGNOSIS — R53.83 OTHER FATIGUE: ICD-10-CM

## 2024-01-03 DIAGNOSIS — E53.8 VITAMIN B12 DEFICIENCY: ICD-10-CM

## 2024-01-03 DIAGNOSIS — R10.9 FLANK PAIN: ICD-10-CM

## 2024-01-03 DIAGNOSIS — I10 BENIGN ESSENTIAL HYPERTENSION: ICD-10-CM

## 2024-01-03 LAB
POC APPEARANCE, URINE: CLEAR
POC BILIRUBIN, URINE: NEGATIVE
POC BLOOD, URINE: NEGATIVE
POC COLOR, URINE: YELLOW
POC GLUCOSE, URINE: NEGATIVE MG/DL
POC KETONES, URINE: ABNORMAL MG/DL
POC LEUKOCYTES, URINE: NEGATIVE
POC NITRITE,URINE: NEGATIVE
POC PH, URINE: 6.5 PH
POC PROTEIN, URINE: NEGATIVE MG/DL
POC SPECIFIC GRAVITY, URINE: 1.01
POC UROBILINOGEN, URINE: 0.2 EU/DL

## 2024-01-03 PROCEDURE — 96372 THER/PROPH/DIAG INJ SC/IM: CPT | Performed by: FAMILY MEDICINE

## 2024-01-03 PROCEDURE — 99214 OFFICE O/P EST MOD 30 MIN: CPT | Performed by: FAMILY MEDICINE

## 2024-01-03 PROCEDURE — 4010F ACE/ARB THERAPY RXD/TAKEN: CPT | Performed by: FAMILY MEDICINE

## 2024-01-03 PROCEDURE — 87086 URINE CULTURE/COLONY COUNT: CPT

## 2024-01-03 PROCEDURE — 3079F DIAST BP 80-89 MM HG: CPT | Performed by: FAMILY MEDICINE

## 2024-01-03 PROCEDURE — 1036F TOBACCO NON-USER: CPT | Performed by: FAMILY MEDICINE

## 2024-01-03 PROCEDURE — 81002 URINALYSIS NONAUTO W/O SCOPE: CPT | Performed by: FAMILY MEDICINE

## 2024-01-03 PROCEDURE — 3074F SYST BP LT 130 MM HG: CPT | Performed by: FAMILY MEDICINE

## 2024-01-03 RX ORDER — NAPROXEN SODIUM 220 MG/1
TABLET, FILM COATED ORAL
COMMUNITY
Start: 2021-11-16 | End: 2024-01-03 | Stop reason: ALTCHOICE

## 2024-01-03 RX ORDER — PEN NEEDLE, DIABETIC 32GX 5/32"
NEEDLE, DISPOSABLE MISCELLANEOUS
COMMUNITY
Start: 2023-08-29 | End: 2024-05-29

## 2024-01-03 RX ORDER — LINACLOTIDE 145 UG/1
1 CAPSULE, GELATIN COATED ORAL DAILY
COMMUNITY
Start: 2023-09-22 | End: 2024-01-03 | Stop reason: ALTCHOICE

## 2024-01-03 RX ORDER — FENOFIBRATE 145 MG/1
TABLET, FILM COATED ORAL
COMMUNITY
End: 2024-02-02 | Stop reason: SDUPTHER

## 2024-01-03 RX ORDER — LISINOPRIL 20 MG/1
1 TABLET ORAL DAILY
COMMUNITY
Start: 2016-10-18

## 2024-01-03 RX ORDER — ALBUTEROL SULFATE 90 UG/1
AEROSOL, METERED RESPIRATORY (INHALATION)
COMMUNITY
Start: 2018-11-16 | End: 2024-01-03 | Stop reason: ALTCHOICE

## 2024-01-03 RX ORDER — DICLOFENAC SODIUM 10 MG/G
GEL TOPICAL
COMMUNITY
Start: 2023-10-11

## 2024-01-03 RX ORDER — PANTOPRAZOLE SODIUM 40 MG/1
1 TABLET, DELAYED RELEASE ORAL 2 TIMES DAILY
COMMUNITY
Start: 2019-10-16 | End: 2024-01-03 | Stop reason: ALTCHOICE

## 2024-01-03 RX ORDER — GLIMEPIRIDE 2 MG/1
TABLET ORAL
COMMUNITY
Start: 2020-12-23 | End: 2024-01-03 | Stop reason: ALTCHOICE

## 2024-01-03 RX ORDER — ERGOCALCIFEROL 1.25 MG/1
CAPSULE ORAL
COMMUNITY
Start: 2020-10-15 | End: 2024-01-03 | Stop reason: ALTCHOICE

## 2024-01-03 RX ORDER — UBIDECARENONE 75 MG
CAPSULE ORAL
COMMUNITY
Start: 2022-07-25 | End: 2024-01-03 | Stop reason: ALTCHOICE

## 2024-01-03 RX ORDER — SUMATRIPTAN SUCCINATE 100 MG/1
100 TABLET ORAL
COMMUNITY
Start: 2016-09-07

## 2024-01-03 RX ORDER — PERPHENAZINE 16 MG
1 TABLET ORAL DAILY
COMMUNITY
Start: 2022-11-01 | End: 2024-01-03 | Stop reason: ALTCHOICE

## 2024-01-03 RX ORDER — TOPIRAMATE 100 MG/1
TABLET, FILM COATED ORAL
COMMUNITY
Start: 2017-08-28 | End: 2024-01-03 | Stop reason: ALTCHOICE

## 2024-01-03 RX ORDER — NAPROXEN 500 MG/1
500 TABLET ORAL 2 TIMES DAILY
COMMUNITY
Start: 2023-10-29 | End: 2023-11-08

## 2024-01-03 RX ORDER — METFORMIN HYDROCHLORIDE 500 MG/1
TABLET, EXTENDED RELEASE ORAL
COMMUNITY
Start: 2022-04-05 | End: 2024-01-03 | Stop reason: ALTCHOICE

## 2024-01-03 RX ORDER — CYANOCOBALAMIN 1000 UG/ML
1000 INJECTION, SOLUTION INTRAMUSCULAR; SUBCUTANEOUS ONCE
Status: COMPLETED | OUTPATIENT
Start: 2024-01-03 | End: 2024-01-03

## 2024-01-03 RX ADMIN — CYANOCOBALAMIN 1000 MCG: 1000 INJECTION, SOLUTION INTRAMUSCULAR; SUBCUTANEOUS at 16:57

## 2024-01-03 ASSESSMENT — ANXIETY QUESTIONNAIRES
GAD7 TOTAL SCORE: 10
5. BEING SO RESTLESS THAT IT IS HARD TO SIT STILL: NOT AT ALL
7. FEELING AFRAID AS IF SOMETHING AWFUL MIGHT HAPPEN: NOT AT ALL
1. FEELING NERVOUS, ANXIOUS, OR ON EDGE: MORE THAN HALF THE DAYS
6. BECOMING EASILY ANNOYED OR IRRITABLE: SEVERAL DAYS
4. TROUBLE RELAXING: SEVERAL DAYS
IF YOU CHECKED OFF ANY PROBLEMS ON THIS QUESTIONNAIRE, HOW DIFFICULT HAVE THESE PROBLEMS MADE IT FOR YOU TO DO YOUR WORK, TAKE CARE OF THINGS AT HOME, OR GET ALONG WITH OTHER PEOPLE: SOMEWHAT DIFFICULT
3. WORRYING TOO MUCH ABOUT DIFFERENT THINGS: NEARLY EVERY DAY
2. NOT BEING ABLE TO STOP OR CONTROL WORRYING: NEARLY EVERY DAY

## 2024-01-03 ASSESSMENT — ENCOUNTER SYMPTOMS
LOSS OF SENSATION IN FEET: 0
DEPRESSION: 1
OCCASIONAL FEELINGS OF UNSTEADINESS: 0

## 2024-01-03 ASSESSMENT — PATIENT HEALTH QUESTIONNAIRE - PHQ9
SUM OF ALL RESPONSES TO PHQ9 QUESTIONS 1 AND 2: 0
1. LITTLE INTEREST OR PLEASURE IN DOING THINGS: NOT AT ALL
2. FEELING DOWN, DEPRESSED OR HOPELESS: NOT AT ALL

## 2024-01-03 ASSESSMENT — PROMIS GLOBAL HEALTH SCALE
RATE_GENERAL_HEALTH: FAIR
RATE_SOCIAL_SATISFACTION: FAIR
CARRYOUT_PHYSICAL_ACTIVITIES: COMPLETELY
RATE_PHYSICAL_HEALTH: FAIR
RATE_QUALITY_OF_LIFE: GOOD
CARRYOUT_SOCIAL_ACTIVITIES: GOOD
RATE_AVERAGE_FATIGUE: SEVERE
RATE_MENTAL_HEALTH: FAIR
RATE_AVERAGE_PAIN: 6
EMOTIONAL_PROBLEMS: OFTEN

## 2024-01-03 ASSESSMENT — COLUMBIA-SUICIDE SEVERITY RATING SCALE - C-SSRS
2. HAVE YOU ACTUALLY HAD ANY THOUGHTS OF KILLING YOURSELF?: NO
1. IN THE PAST MONTH, HAVE YOU WISHED YOU WERE DEAD OR WISHED YOU COULD GO TO SLEEP AND NOT WAKE UP?: NO
6. HAVE YOU EVER DONE ANYTHING, STARTED TO DO ANYTHING, OR PREPARED TO DO ANYTHING TO END YOUR LIFE?: NO

## 2024-01-03 NOTE — PROGRESS NOTES
"Subjective   Patient ID: Denise Kurtz is a 38 y.o. female who presents for Annual Exam (Annual wellness, fatigue and L back pain. Went to ER and they saw a very small kidney stone.).    Left back pain started about 2 months ago with no known injury. Reports left flank pain described as achy pressure like sensation. Is made worse when bladder is full, improved with bladder emptying. Relieved with rest and heat but returns once therapy is completed. Radiates to the right side and front of right upper abdomen. Was seen in ER recently for this pain, ultrasound was completed but results are outside of current EMR and not immediately available. Per patient she was told there was a renal stone within the left kidney but was not the cause of her pain.     Fatigued over the past couple of months. Sleeps from 10-7:30 but does get up to go to urinate 2-3 times a night but is able to go back to sleep. Does have a history of vitamin b deficiency and is not currently on a vitamin supplement for this.          Review of Systems   All other systems reviewed and are negative.      Objective   /85 (BP Location: Left arm, Patient Position: Sitting)   Pulse 84   Ht 1.727 m (5' 8\")   Wt 105 kg (231 lb)   BMI 35.12 kg/m²     Physical Exam  Constitutional:       Appearance: Normal appearance. She is obese.   HENT:      Head: Normocephalic and atraumatic.   Cardiovascular:      Rate and Rhythm: Normal rate and regular rhythm.      Heart sounds: No murmur heard.     No gallop.   Pulmonary:      Effort: Pulmonary effort is normal. No respiratory distress.      Breath sounds: Normal breath sounds.   Abdominal:      General: Bowel sounds are normal. There is no distension.      Tenderness: There is abdominal tenderness in the left upper quadrant. There is left CVA tenderness.   Musculoskeletal:         General: Normal range of motion.   Skin:     General: Skin is warm and dry.      Findings: No lesion or rash.   Neurological:      " General: No focal deficit present.      Mental Status: She is alert and oriented to person, place, and time. Mental status is at baseline.   Psychiatric:         Mood and Affect: Mood normal.         Behavior: Behavior normal.         Assessment/Plan   Problem List Items Addressed This Visit             ICD-10-CM    Benign essential hypertension I10    Relevant Orders    Comprehensive Metabolic Panel    Fatigue R53.83    Relevant Orders    TSH with reflex to Free T4 if abnormal    CBC and Auto Differential    Vitamin D 25-Hydroxy,Total (for eval of Vitamin D levels)    Vitamin B12    Hematuria R31.9    Relevant Orders    CT abdomen pelvis wo IV contrast    Vitamin B12 deficiency E53.8    Relevant Medications    cyanocobalamin (Vitamin B-12) injection 1,000 mcg (Completed)     Other Visit Diagnoses         Codes    Lipid screening    -  Primary Z13.220    Relevant Orders    Lipid Panel    Dysuria     R30.0    Relevant Orders    POCT UA (nonautomated) manually resulted (Completed)    Urine Culture    Flank pain     R10.9    Relevant Orders    CT abdomen pelvis wo IV contrast    Renal calculi     N20.0    Relevant Orders    CT abdomen pelvis wo IV contrast

## 2024-01-05 LAB — BACTERIA UR CULT: ABNORMAL

## 2024-01-05 RX ORDER — HYDROCODONE BITARTRATE AND ACETAMINOPHEN 5; 325 MG/1; MG/1
1 TABLET ORAL EVERY 6 HOURS PRN
Qty: 20 TABLET | Refills: 0 | Status: SHIPPED | OUTPATIENT
Start: 2024-01-05 | End: 2024-01-10

## 2024-01-05 RX ORDER — SULFAMETHOXAZOLE AND TRIMETHOPRIM 800; 160 MG/1; MG/1
1 TABLET ORAL 2 TIMES DAILY
Qty: 14 TABLET | Refills: 0 | Status: SHIPPED | OUTPATIENT
Start: 2024-01-05 | End: 2024-01-12

## 2024-01-05 NOTE — RESULT ENCOUNTER NOTE
Multiple organisms present, will treat with antibiotic due to patient's reports of severe symptoms but unsure if this will help, awaiting CT abdomen

## 2024-01-06 ENCOUNTER — LAB (OUTPATIENT)
Dept: LAB | Facility: LAB | Age: 39
End: 2024-01-06
Payer: COMMERCIAL

## 2024-01-06 DIAGNOSIS — R53.83 OTHER FATIGUE: ICD-10-CM

## 2024-01-06 DIAGNOSIS — R30.0 DYSURIA: ICD-10-CM

## 2024-01-06 DIAGNOSIS — R10.9 FLANK PAIN: ICD-10-CM

## 2024-01-06 DIAGNOSIS — I10 BENIGN ESSENTIAL HYPERTENSION: ICD-10-CM

## 2024-01-06 DIAGNOSIS — Z13.220 LIPID SCREENING: ICD-10-CM

## 2024-01-06 LAB
25(OH)D3 SERPL-MCNC: 11 NG/ML (ref 30–100)
ALBUMIN SERPL BCP-MCNC: 4.5 G/DL (ref 3.4–5)
ALP SERPL-CCNC: 50 U/L (ref 33–110)
ALT SERPL W P-5'-P-CCNC: 19 U/L (ref 7–45)
ANION GAP SERPL CALC-SCNC: 13 MMOL/L (ref 10–20)
APPEARANCE UR: CLEAR
AST SERPL W P-5'-P-CCNC: 24 U/L (ref 9–39)
BACTERIA #/AREA URNS AUTO: ABNORMAL /HPF
BASOPHILS # BLD AUTO: 0.1 X10*3/UL (ref 0–0.1)
BASOPHILS NFR BLD AUTO: 0.9 %
BILIRUB SERPL-MCNC: 0.3 MG/DL (ref 0–1.2)
BILIRUB UR STRIP.AUTO-MCNC: ABNORMAL MG/DL
BUN SERPL-MCNC: 13 MG/DL (ref 6–23)
CALCIUM SERPL-MCNC: 9.4 MG/DL (ref 8.6–10.3)
CHLORIDE SERPL-SCNC: 106 MMOL/L (ref 98–107)
CHOLEST SERPL-MCNC: 160 MG/DL (ref 0–199)
CHOLESTEROL/HDL RATIO: 4.6
CO2 SERPL-SCNC: 24 MMOL/L (ref 21–32)
COLOR UR: YELLOW
CREAT SERPL-MCNC: 0.94 MG/DL (ref 0.5–1.05)
EOSINOPHIL # BLD AUTO: 0.18 X10*3/UL (ref 0–0.7)
EOSINOPHIL NFR BLD AUTO: 1.6 %
ERYTHROCYTE [DISTWIDTH] IN BLOOD BY AUTOMATED COUNT: 13.3 % (ref 11.5–14.5)
GFR SERPL CREATININE-BSD FRML MDRD: 79 ML/MIN/1.73M*2
GLUCOSE SERPL-MCNC: 123 MG/DL (ref 74–99)
GLUCOSE UR STRIP.AUTO-MCNC: NEGATIVE MG/DL
HCT VFR BLD AUTO: 48 % (ref 36–46)
HDLC SERPL-MCNC: 35.1 MG/DL
HGB BLD-MCNC: 15.3 G/DL (ref 12–16)
HOLD SPECIMEN: 293
HYALINE CASTS #/AREA URNS AUTO: ABNORMAL /LPF
IMM GRANULOCYTES # BLD AUTO: 0.04 X10*3/UL (ref 0–0.7)
IMM GRANULOCYTES NFR BLD AUTO: 0.4 % (ref 0–0.9)
KETONES UR STRIP.AUTO-MCNC: NEGATIVE MG/DL
LDLC SERPL CALC-MCNC: 80 MG/DL
LEUKOCYTE ESTERASE UR QL STRIP.AUTO: NEGATIVE
LYMPHOCYTES # BLD AUTO: 3.27 X10*3/UL (ref 1.2–4.8)
LYMPHOCYTES NFR BLD AUTO: 29.6 %
MCH RBC QN AUTO: 29.1 PG (ref 26–34)
MCHC RBC AUTO-ENTMCNC: 31.9 G/DL (ref 32–36)
MCV RBC AUTO: 91 FL (ref 80–100)
MONOCYTES # BLD AUTO: 0.56 X10*3/UL (ref 0.1–1)
MONOCYTES NFR BLD AUTO: 5.1 %
MUCOUS THREADS #/AREA URNS AUTO: ABNORMAL /LPF
NEUTROPHILS # BLD AUTO: 6.88 X10*3/UL (ref 1.2–7.7)
NEUTROPHILS NFR BLD AUTO: 62.4 %
NITRITE UR QL STRIP.AUTO: NEGATIVE
NON HDL CHOLESTEROL: 125 MG/DL (ref 0–149)
NRBC BLD-RTO: 0.2 /100 WBCS (ref 0–0)
PH UR STRIP.AUTO: 5 [PH]
PLATELET # BLD AUTO: 352 X10*3/UL (ref 150–450)
POTASSIUM SERPL-SCNC: 4.2 MMOL/L (ref 3.5–5.3)
PROT SERPL-MCNC: 6.5 G/DL (ref 6.4–8.2)
PROT UR STRIP.AUTO-MCNC: ABNORMAL MG/DL
RBC # BLD AUTO: 5.26 X10*6/UL (ref 4–5.2)
RBC # UR STRIP.AUTO: NEGATIVE /UL
RBC #/AREA URNS AUTO: ABNORMAL /HPF
SODIUM SERPL-SCNC: 139 MMOL/L (ref 136–145)
SP GR UR STRIP.AUTO: 1.02
SQUAMOUS #/AREA URNS AUTO: ABNORMAL /HPF
TRIGL SERPL-MCNC: 225 MG/DL (ref 0–149)
TSH SERPL-ACNC: 2.08 MIU/L (ref 0.44–3.98)
UROBILINOGEN UR STRIP.AUTO-MCNC: <2 MG/DL
VIT B12 SERPL-MCNC: 414 PG/ML (ref 211–911)
VLDL: 45 MG/DL (ref 0–40)
WBC # BLD AUTO: 11 X10*3/UL (ref 4.4–11.3)
WBC #/AREA URNS AUTO: ABNORMAL /HPF

## 2024-01-06 PROCEDURE — 82306 VITAMIN D 25 HYDROXY: CPT

## 2024-01-06 PROCEDURE — 84443 ASSAY THYROID STIM HORMONE: CPT

## 2024-01-06 PROCEDURE — 82607 VITAMIN B-12: CPT

## 2024-01-06 PROCEDURE — 81001 URINALYSIS AUTO W/SCOPE: CPT

## 2024-01-06 PROCEDURE — 85025 COMPLETE CBC W/AUTO DIFF WBC: CPT

## 2024-01-06 PROCEDURE — 80053 COMPREHEN METABOLIC PANEL: CPT

## 2024-01-06 PROCEDURE — 36415 COLL VENOUS BLD VENIPUNCTURE: CPT

## 2024-01-06 PROCEDURE — 80061 LIPID PANEL: CPT

## 2024-01-11 DIAGNOSIS — U07.1 COVID-19: Primary | ICD-10-CM

## 2024-01-16 ENCOUNTER — TELEPHONE (OUTPATIENT)
Dept: PRIMARY CARE | Facility: CLINIC | Age: 39
End: 2024-01-16
Payer: COMMERCIAL

## 2024-01-16 DIAGNOSIS — E55.9 VITAMIN D DEFICIENCY: Primary | ICD-10-CM

## 2024-01-16 RX ORDER — ERGOCALCIFEROL 1.25 MG/1
50000 CAPSULE ORAL
Qty: 12 CAPSULE | Refills: 1 | Status: SHIPPED | OUTPATIENT
Start: 2024-01-16 | End: 2024-07-02

## 2024-01-16 NOTE — RESULT ENCOUNTER NOTE
Vitamin d is very low, I have sent in high dose supplement. All other labs are essentially normal for patient.

## 2024-01-16 NOTE — TELEPHONE ENCOUNTER
----- Message from YOSHI Michele-CNP sent at 1/16/2024  2:39 PM EST -----  Vitamin d is very low, I have sent in high dose supplement. All other labs are essentially normal for patient.

## 2024-01-17 ENCOUNTER — APPOINTMENT (OUTPATIENT)
Dept: RADIOLOGY | Facility: HOSPITAL | Age: 39
End: 2024-01-17
Payer: COMMERCIAL

## 2024-01-18 ENCOUNTER — APPOINTMENT (OUTPATIENT)
Dept: RADIOLOGY | Facility: HOSPITAL | Age: 39
End: 2024-01-18
Payer: COMMERCIAL

## 2024-02-02 ENCOUNTER — TELEPHONE (OUTPATIENT)
Dept: PRIMARY CARE | Facility: CLINIC | Age: 39
End: 2024-02-02
Payer: COMMERCIAL

## 2024-02-02 DIAGNOSIS — I10 HYPERTENSION, UNSPECIFIED TYPE: ICD-10-CM

## 2024-02-02 DIAGNOSIS — E78.5 HYPERLIPIDEMIA, UNSPECIFIED HYPERLIPIDEMIA TYPE: ICD-10-CM

## 2024-02-02 RX ORDER — LOSARTAN POTASSIUM 100 MG/1
TABLET ORAL
Qty: 30 TABLET | Refills: 5 | Status: SHIPPED | OUTPATIENT
Start: 2024-02-02

## 2024-02-02 RX ORDER — FENOFIBRATE 145 MG/1
TABLET, FILM COATED ORAL
Qty: 90 TABLET | Refills: 1 | Status: SHIPPED | OUTPATIENT
Start: 2024-02-02 | End: 2024-02-15

## 2024-02-02 NOTE — TELEPHONE ENCOUNTER
Losartan -TAKE ONE (1) TABLET BY MOUTH ONCE DAILY. **(COZAAR 100 MG TAB EQUIV)**  Fenofibrate-TAKE ONE (1) TABLET BY MOUTH ONCE DAILY.       [facility]     LOV: 1/3/24  NOV:

## 2024-02-07 ENCOUNTER — HOSPITAL ENCOUNTER (OUTPATIENT)
Dept: RADIOLOGY | Facility: HOSPITAL | Age: 39
Discharge: HOME | End: 2024-02-07
Payer: COMMERCIAL

## 2024-02-07 DIAGNOSIS — R10.9 FLANK PAIN: ICD-10-CM

## 2024-02-07 DIAGNOSIS — N20.0 RENAL CALCULI: ICD-10-CM

## 2024-02-07 DIAGNOSIS — R31.29 OTHER MICROSCOPIC HEMATURIA: ICD-10-CM

## 2024-02-07 PROCEDURE — 74176 CT ABD & PELVIS W/O CONTRAST: CPT | Performed by: STUDENT IN AN ORGANIZED HEALTH CARE EDUCATION/TRAINING PROGRAM

## 2024-02-07 PROCEDURE — 74176 CT ABD & PELVIS W/O CONTRAST: CPT

## 2024-02-09 ENCOUNTER — PATIENT MESSAGE (OUTPATIENT)
Dept: PRIMARY CARE | Facility: CLINIC | Age: 39
End: 2024-02-09
Payer: COMMERCIAL

## 2024-02-09 DIAGNOSIS — E53.8 VITAMIN B12 DEFICIENCY: Primary | ICD-10-CM

## 2024-02-14 RX ORDER — CYANOCOBALAMIN 1000 UG/ML
1000 INJECTION, SOLUTION INTRAMUSCULAR; SUBCUTANEOUS
Qty: 1 ML | Refills: 3 | Status: SHIPPED | OUTPATIENT
Start: 2024-02-14 | End: 2024-03-07

## 2024-02-15 DIAGNOSIS — E78.5 HYPERLIPIDEMIA, UNSPECIFIED HYPERLIPIDEMIA TYPE: ICD-10-CM

## 2024-02-15 RX ORDER — FENOFIBRATE 145 MG/1
TABLET, FILM COATED ORAL
Qty: 90 TABLET | Refills: 1 | Status: SHIPPED | OUTPATIENT
Start: 2024-02-15

## 2024-02-21 ENCOUNTER — TELEPHONE (OUTPATIENT)
Dept: PRIMARY CARE | Facility: CLINIC | Age: 39
End: 2024-02-21
Payer: COMMERCIAL

## 2024-02-21 DIAGNOSIS — E11.9 TYPE 2 DIABETES MELLITUS WITHOUT COMPLICATION, UNSPECIFIED WHETHER LONG TERM INSULIN USE (MULTI): Primary | ICD-10-CM

## 2024-02-21 NOTE — TELEPHONE ENCOUNTER
Called patient to let her know she needs seen for her fmla papers and she said it doesn't need done and we can shred.

## 2024-02-21 NOTE — TELEPHONE ENCOUNTER
Patient called and state Mounjaro is now on back order, and wanted to let you know, she take her last shot next Thursday 2/29/2024

## 2024-03-01 ENCOUNTER — PHARMACY VISIT (OUTPATIENT)
Dept: PHARMACY | Facility: CLINIC | Age: 39
End: 2024-03-01
Payer: COMMERCIAL

## 2024-03-01 ENCOUNTER — HOSPITAL ENCOUNTER (EMERGENCY)
Facility: HOSPITAL | Age: 39
Discharge: HOME | End: 2024-03-01
Attending: EMERGENCY MEDICINE
Payer: COMMERCIAL

## 2024-03-01 VITALS
RESPIRATION RATE: 20 BRPM | WEIGHT: 225 LBS | OXYGEN SATURATION: 99 % | SYSTOLIC BLOOD PRESSURE: 133 MMHG | BODY MASS INDEX: 34.1 KG/M2 | HEIGHT: 68 IN | HEART RATE: 85 BPM | TEMPERATURE: 97.6 F | DIASTOLIC BLOOD PRESSURE: 89 MMHG

## 2024-03-01 DIAGNOSIS — M54.81 OCCIPITAL NEURALGIA OF RIGHT SIDE: Primary | ICD-10-CM

## 2024-03-01 LAB
FLUAV RNA RESP QL NAA+PROBE: NOT DETECTED
FLUBV RNA RESP QL NAA+PROBE: NOT DETECTED
RSV RNA RESP QL NAA+PROBE: NOT DETECTED
SARS-COV-2 RNA RESP QL NAA+PROBE: NOT DETECTED

## 2024-03-01 PROCEDURE — 99283 EMERGENCY DEPT VISIT LOW MDM: CPT

## 2024-03-01 PROCEDURE — RXMED WILLOW AMBULATORY MEDICATION CHARGE

## 2024-03-01 PROCEDURE — 2500000004 HC RX 250 GENERAL PHARMACY W/ HCPCS (ALT 636 FOR OP/ED): Performed by: NURSE PRACTITIONER

## 2024-03-01 PROCEDURE — 87637 SARSCOV2&INF A&B&RSV AMP PRB: CPT | Performed by: NURSE PRACTITIONER

## 2024-03-01 RX ORDER — PREDNISONE 20 MG/1
40 TABLET ORAL DAILY
Qty: 10 TABLET | Refills: 0 | Status: SHIPPED | OUTPATIENT
Start: 2024-03-01 | End: 2024-03-06

## 2024-03-01 RX ORDER — PREDNISONE 20 MG/1
60 TABLET ORAL ONCE
Status: COMPLETED | OUTPATIENT
Start: 2024-03-01 | End: 2024-03-01

## 2024-03-01 RX ADMIN — PREDNISONE 60 MG: 20 TABLET ORAL at 18:44

## 2024-03-01 ASSESSMENT — PAIN - FUNCTIONAL ASSESSMENT: PAIN_FUNCTIONAL_ASSESSMENT: 0-10

## 2024-03-01 ASSESSMENT — PAIN SCALES - GENERAL: PAINLEVEL_OUTOF10: 7

## 2024-03-01 NOTE — ED PROVIDER NOTES
Chief Complaint   Patient presents with   • Earache   • Nasal Congestion   • Headache       HPI       38 year old female presents to the Emergency Department today complaining of a 2-3 week history of right sided headache that she describes as throbbing in nature, constant radiates throughout the right side of her face and neck, and varies in intensity. The headache is not the first or worst of their life. Not of sudden onset or thunderclap in nature. Notes to having some nasal congestion associated with the above. Denies any associated fever, chills,  neck pain, chest pain, shortness of breath, abdominal pain, nausea, vomiting, diarrhea, constipation, or urinary symptoms. Denies any injury/trauma to the area.       History provided by:  Patient             Patient History   Past Medical History:   Diagnosis Date   • Acute upper respiratory infection, unspecified 2019    Acute URI of multiple sites   • Anemia 01/15/2003   • Anxiety    • Anxiety disorder, unspecified     Anxiety   • Diabetes mellitus (CMS/formerly [facility])    • Headache    • Hypertension    • Personal history of diseases of the blood and blood-forming organs and certain disorders involving the immune mechanism     History of anemia   • Personal history of urinary calculi     History of renal calculi   • Urinary tract infection      Past Surgical History:   Procedure Laterality Date   • BLADDER SURGERY  2017    Bladder Surgery   •  SECTION, LOW TRANSVERSE  ,,   • CHOLECYSTECTOMY  2017    Cholecystectomy   • DILATION AND CURETTAGE OF UTERUS  2017    Dilation And Curettage   • ENDOMETRIAL ABLATION     • HYSTERECTOMY  2017    Hysterectomy   • OOPHORECTOMY  2017    Oophorectomy   • OTHER SURGICAL HISTORY  2019    Tubal ligation   • OTHER SURGICAL HISTORY  10/25/2022    Shoulder surgery   • TUBAL LIGATION     • WISDOM TOOTH EXTRACTION       Family History   Problem Relation  Name Age of Onset   • COPD Mother Vee    • Depression Mother Vee    • Drug abuse Mother Vee    • Diabetes Father Tam    • Drug abuse Father Tam    • Hypertension Father Tam    • Kidney disease Father Tam    • Drug abuse Sister Roberta    • Drug abuse Brother Tam      Social History     Tobacco Use   • Smoking status: Never   • Smokeless tobacco: Never   Substance Use Topics   • Alcohol use: Not Currently     Comment: Occasional drink at special events   • Drug use: Never           Physical Exam  Constitutional:       Appearance: Normal appearance.   HENT:      Head: Normocephalic.      Right Ear: Tympanic membrane, ear canal and external ear normal.      Left Ear: Tympanic membrane, ear canal and external ear normal.      Nose: Nose normal.      Mouth/Throat:      Mouth: Mucous membranes are moist.      Pharynx: Oropharynx is clear. No oropharyngeal exudate or posterior oropharyngeal erythema.   Eyes:      Conjunctiva/sclera: Conjunctivae normal.      Pupils: Pupils are equal, round, and reactive to light.   Cardiovascular:      Rate and Rhythm: Normal rate and regular rhythm.      Pulses:           Radial pulses are 3+ on the right side and 3+ on the left side.        Dorsalis pedis pulses are 3+ on the right side and 3+ on the left side.      Heart sounds: Normal heart sounds. No murmur heard.     No friction rub. No gallop.   Pulmonary:      Effort: Pulmonary effort is normal. No respiratory distress.      Breath sounds: Normal breath sounds. No wheezing, rhonchi or rales.   Abdominal:      General: Abdomen is flat. Bowel sounds are normal.      Palpations: Abdomen is soft.      Tenderness: There is no abdominal tenderness. There is no right CVA tenderness, left CVA tenderness, guarding or rebound. Negative signs include Almanza's sign and McBurney's sign.   Musculoskeletal:         General: No swelling or deformity.      Cervical back: Full passive range of motion without pain.       Right lower leg: No edema.      Left lower leg: No edema.   Lymphadenopathy:      Cervical: No cervical adenopathy.   Skin:     Capillary Refill: Capillary refill takes less than 2 seconds.      Coloration: Skin is not jaundiced.      Findings: No rash.   Neurological:      General: No focal deficit present.      Mental Status: She is alert and oriented to person, place, and time. Mental status is at baseline.      Gait: Gait is intact.   Psychiatric:         Mood and Affect: Mood normal.         Behavior: Behavior is cooperative.         Labs Reviewed   SARS-COV-2 AND INFLUENZA A/B PCR - Normal       Result Value    Flu A Result Not Detected      Flu B Result Not Detected      Coronavirus 2019, PCR Not Detected      Narrative:     This assay has received FDA Emergency Use Authorization (EUA) and  is only authorized for the duration of time that circumstances exist to justify the authorization of the emergency use of in vitro diagnostic tests for the detection of SARS-CoV-2 virus and/or diagnosis of COVID-19 infection under section 564(b)(1) of the Act, 21 U.S.C. 360bbb-3(b)(1). Testing for SARS-CoV-2 is only recommended for patients who meet current clinical and/or epidemiological criteria as defined by federal, state, or local public health directives. This assay is an in vitro diagnostic nucleic acid amplification test for the qualitative detection of SARS-CoV-2, Influenza A, and Influenza B from nasopharyngeal specimens and has been validated for use at [facility]. Negative results do not preclude COVID-19 infections or Influenza A/B infections, and should not be used as the sole basis for diagnosis, treatment, or other management decisions. If Influenza A/B and RSV PCR results are negative, testing for Parainfluenza virus, Adenovirus and Metapneumovirus is routinely performed for [facility] pediatric oncology and intensive care inpatients, and is available on other patients by placing an add-on  "request.    RSV PCR - Normal    RSV PCR Not Detected      Narrative:     This assay is an FDA-cleared, in vitro diagnostic nucleic acid amplification test for the detection of RSV from nasopharyngeal specimens, and has been validated for use at [facility]. Negative results do not preclude RSV infections, and should not be used as the sole basis for diagnosis, treatment, or other management decisions. If Influenza A/B and RSV PCR results are negative, testing for Parainfluenza virus, Adenovirus and Metapneumovirus is routinely performed for pediatric oncology and intensive care inpatients at [facility] and is available on other patients by placing an add-on request.           No orders to display            ED Course & MDM   Diagnoses as of 03/01/24 1819   Occipital neuralgia of right side           Medical Decision Making  Patient was seen and evaluated by Dr. Rodriguez. There are no signs of otitis media, otitis externa, mastoiditis, cellulitis, shingles, meningitis, or subarachnoid hemorrhage. Could be related to occipital neuralgia. Given a prescription for Prednisone. Follow up with their doctor in 3 days. Return if worse in any way. Discharged in stable condition with computer instructions.    Diagnostic Impression:     1. Acute cephalgia suspect occipital neuralgia    2. Prescription therapy            Your medication list        CONTINUE taking these medications        Instructions Last Dose Given Next Dose Due   Sure Comfort Pen Needle 32 gauge x 5/32\" needle  Generic drug: pen needle, diabetic           syringe-needle,safety,disp unt 3 mL 23 gauge x 1\" syringe      To administer vitamin b injections              ASK your doctor about these medications        Instructions Last Dose Given Next Dose Due   amLODIPine 5 mg tablet  Commonly known as: Norvasc      Take 1 tablet (5 mg) by mouth once daily.       atorvastatin 40 mg tablet  Commonly known as: Lipitor      TAKE ONE (1) TABLET BY MOUTH " ONCE DAILY AT BEDTIME. **(LIPITOR 40 MG TAB EQUIV)**       cyanocobalamin 1,000 mcg/mL injection  Commonly known as: Vitamin B-12      Inject 1 mL (1,000 mcg) into the muscle 1 (one) time per week for 4 doses.       diclofenac sodium 1 % gel  Commonly known as: Voltaren           DULoxetine 60 mg DR capsule  Commonly known as: Cymbalta      TAKE TWO (2) CAPSULES BY MOUTH ONCE DAILY. **(CYMBALTA 60 MG CAP EQUIV)**       ergocalciferol 1.25 MG (70034 UT) capsule  Commonly known as: Vitamin D-2      Take 1 capsule (50,000 Units) by mouth 1 (one) time per week.       fenofibrate 145 mg tablet  Commonly known as: Tricor      TAKE ONE (1) TABLET BY MOUTH ONCE DAILY. **(TRICOR 145 MG TAB EQUIV)**       HumuLIN R U-500 (Conc) Kwikpen 500 unit/mL (3 mL) CONCENTRATED injection  Generic drug: insulin regular      INJECT 105 UNIT SUBCUTANEOUSLY ONCE DAILY.       lisinopril 20 mg tablet           losartan 100 mg tablet  Commonly known as: Cozaar      TAKE ONE (1) TABLET BY MOUTH ONCE DAILY. **(COZAAR 100 MG TAB EQUIV)**       Mounjaro 15 mg/0.5 mL pen injector  Generic drug: tirzepatide      Inject 15 mg under the skin every 7 days.       propranolol  mg 24 hr capsule  Commonly known as: Inderal LA      TAKE ONE (1) CAPSULE BY MOUTH ONCE DAILY. **(INDERAL  MG CAP EQUIV)**       SUMAtriptan 100 mg tablet  Commonly known as: Imitrex           True Metrix Glucose Test Strip strip  Generic drug: blood sugar diagnostic      USE TO TEST BLOOD GLUCOSE LEVEL FOUR (4) TIMES PER DAY.                  Procedure  Procedures     Gucci Hutchison, YOSHI-CNP  03/01/24 9904

## 2024-03-22 ENCOUNTER — PATIENT MESSAGE (OUTPATIENT)
Dept: ENDOCRINOLOGY | Facility: CLINIC | Age: 39
End: 2024-03-22
Payer: COMMERCIAL

## 2024-03-26 NOTE — PATIENT COMMUNICATION
Patient called the office and advised that she does not wish to change from Mounjaro to Ozempic.  She is going to continue to call around to see where the Mounjaro may be available.  I also provided her with the [employer] rep that visits our office to see if she may assist the patient on locating Mounjaro.    No action needed at this time.

## 2024-03-27 NOTE — TELEPHONE ENCOUNTER
Patient states she has not been able to get mounjaro. She was able to find  mounjaro 10 mg at [facility] ambulatory pharmacy. She requests new rx be sent there as soon as possible. She has been advised by pharmacy they can not hold it. Patient also informed that Dr. Aaron is out of office until 4/1/24 but will be contacted once rx is placed.

## 2024-03-29 RX ORDER — TIRZEPATIDE 10 MG/.5ML
10 INJECTION, SOLUTION SUBCUTANEOUS
Qty: 2 ML | Refills: 5 | Status: SHIPPED | OUTPATIENT
Start: 2024-03-29 | End: 2024-09-25

## 2024-03-29 RX ORDER — TIRZEPATIDE 10 MG/.5ML
4 INJECTION, SOLUTION SUBCUTANEOUS
COMMUNITY
End: 2024-03-29 | Stop reason: SDUPTHER

## 2024-03-29 NOTE — TELEPHONE ENCOUNTER
Disregard last message, patient will like Mounjaro to be sent to [facility]

## 2024-03-29 NOTE — TELEPHONE ENCOUNTER
Patient called today to check status on rx for mounjaro 10mg to be sent to [facility]

## 2024-05-28 DIAGNOSIS — E11.9 TYPE 2 DIABETES MELLITUS WITHOUT COMPLICATION, WITH LONG-TERM CURRENT USE OF INSULIN (MULTI): Primary | ICD-10-CM

## 2024-05-28 DIAGNOSIS — Z79.4 TYPE 2 DIABETES MELLITUS WITHOUT COMPLICATION, WITH LONG-TERM CURRENT USE OF INSULIN (MULTI): Primary | ICD-10-CM

## 2024-05-29 RX ORDER — PEN NEEDLE, DIABETIC 32GX 5/32"
NEEDLE, DISPOSABLE MISCELLANEOUS
Qty: 200 EACH | Refills: 3 | Status: SHIPPED | OUTPATIENT
Start: 2024-05-29 | End: 2025-05-29

## 2024-06-20 ENCOUNTER — APPOINTMENT (OUTPATIENT)
Dept: ENDOCRINOLOGY | Facility: CLINIC | Age: 39
End: 2024-06-20
Payer: COMMERCIAL

## 2024-06-20 ENCOUNTER — TELEPHONE (OUTPATIENT)
Dept: PRIMARY CARE | Facility: CLINIC | Age: 39
End: 2024-06-20

## 2024-06-20 VITALS
WEIGHT: 232 LBS | DIASTOLIC BLOOD PRESSURE: 82 MMHG | HEART RATE: 81 BPM | BODY MASS INDEX: 35.16 KG/M2 | HEIGHT: 68 IN | SYSTOLIC BLOOD PRESSURE: 120 MMHG

## 2024-06-20 DIAGNOSIS — Z79.4 LONG-TERM INSULIN USE (MULTI): ICD-10-CM

## 2024-06-20 DIAGNOSIS — E53.8 VITAMIN B12 DEFICIENCY: ICD-10-CM

## 2024-06-20 DIAGNOSIS — E11.9 TYPE 2 DIABETES MELLITUS WITHOUT COMPLICATION, UNSPECIFIED WHETHER LONG TERM INSULIN USE (MULTI): Primary | ICD-10-CM

## 2024-06-20 LAB
POC FINGERSTICK BLOOD GLUCOSE: 142 MG/DL (ref 70–100)
POC HEMOGLOBIN A1C: 6.7 % (ref 4.2–6.5)

## 2024-06-20 PROCEDURE — 4010F ACE/ARB THERAPY RXD/TAKEN: CPT | Performed by: INTERNAL MEDICINE

## 2024-06-20 PROCEDURE — 82962 GLUCOSE BLOOD TEST: CPT | Performed by: INTERNAL MEDICINE

## 2024-06-20 PROCEDURE — 99214 OFFICE O/P EST MOD 30 MIN: CPT | Performed by: INTERNAL MEDICINE

## 2024-06-20 PROCEDURE — 83036 HEMOGLOBIN GLYCOSYLATED A1C: CPT | Performed by: INTERNAL MEDICINE

## 2024-06-20 PROCEDURE — 3048F LDL-C <100 MG/DL: CPT | Performed by: INTERNAL MEDICINE

## 2024-06-20 PROCEDURE — 3074F SYST BP LT 130 MM HG: CPT | Performed by: INTERNAL MEDICINE

## 2024-06-20 PROCEDURE — 3079F DIAST BP 80-89 MM HG: CPT | Performed by: INTERNAL MEDICINE

## 2024-06-20 RX ORDER — CYANOCOBALAMIN 1000 UG/ML
1000 INJECTION, SOLUTION INTRAMUSCULAR; SUBCUTANEOUS
Qty: 1 ML | Refills: 11 | Status: SHIPPED | OUTPATIENT
Start: 2024-06-20

## 2024-06-20 RX ORDER — SEMAGLUTIDE 0.68 MG/ML
0.25 INJECTION, SOLUTION SUBCUTANEOUS
Qty: 3 ML | Refills: 0 | Status: SHIPPED | OUTPATIENT
Start: 2024-06-20 | End: 2024-07-20

## 2024-06-20 ASSESSMENT — ENCOUNTER SYMPTOMS
UNEXPECTED WEIGHT CHANGE: 1
DIAPHORESIS: 1
NUMBNESS: 1
BACK PAIN: 1
PALPITATIONS: 1
NAUSEA: 1
FATIGUE: 1

## 2024-06-20 NOTE — TELEPHONE ENCOUNTER
----- Message from Denise Kurtz sent at 6/20/2024 10:34 AM EDT -----  Regarding: Vitamin B injection   Contact: [phone]  I still have not received a refill on the vitamin B injection. [facility] said they have sent the request a few times.  I do feel I still need this medication as my energy level is extremely low.  Please let me know if you will be ordering it. Thank you.

## 2024-06-20 NOTE — PATIENT INSTRUCTIONS
Thank you for choosing [facility]  for your health care needs.  If you have any questions, concerns or medical needs, please feel free to contact our office at [phone].    Please ensure you complete your blood work one week before the next scheduled appointment.    To continue Humulin R U500 105 units SQ before breakfast  To continue Mounjaro 15 mg SQ once weekly (currently on backorder)  To commence Ozempic as follows since Mounjaro is on backorder   0.25mg SQ once weekly for the first four weeks, then   0.5mg SQ weekly for four weeks, then  1mg SQ weekly for four weeks, and then  2mg SQ weekly   Counseled to continue an exercise regimen to include 150 minutes of moderate intensity exercise per week of moderate intensity    The A1C is at goal   For follow up in 6 months

## 2024-06-20 NOTE — PROGRESS NOTES
Subjective   Denise Kurtz is a 38 y.o. female who presents for follow-up of Type 2 diabetes mellitus. The initial diagnosis of diabetes was made in 2013 .     She has had no major changes to her health since her last appointment.      Her colonoscopy was unrevealing.  She uses Linzess when constipated.       Known complications due to diabetes included none.      Cardiovascular risk factors include diabetes mellitus and obesity (BMI >= 30 kg/m2). The patient is on an ACE inhibitor or angiotensin II receptor blocker.  The patient has not been previously hospitalized due to diabetic ketoacidosis.     Current symptoms/problems include none. Her clinical course has been stable.     The patient is currently checking the blood glucose.      Patient is using: glucometer  Today 160mgdL   Fasting 150mg/dL   Evening 100-120mg/dL     Hypoglycemia frequency: Denies   Hypoglycemia awareness: Yes     Current Medication Regimen  Humulin U500 105 units in the morning  Mounjaro 12.5mg SQ once weekly - last use was in April due to national shortage      She denies GDM with three children (two babies over 9 lbs)     MEALS: increased appetite   Breakfast - steal oameals with sweet and lo and whole milk   Lunch - can omit; canned tuna  Dinner - chicken and broccoli, brown rice, teriyaki chicken, carrrot   Snacks before bed - celery and lakisha, celbery and peanut butter, apples with peanut butter   Beverages - decaf coffee, water, Vitamin water     Exercise regimen - walks dogs separately (4)     Family history  Father- type 2 DM     Review of Systems   Constitutional:  Positive for diaphoresis, fatigue and unexpected weight change (Weight gain).   Eyes:  Positive for visual disturbance.   Cardiovascular:  Positive for palpitations.   Gastrointestinal:  Positive for nausea.   Musculoskeletal:  Positive for back pain.   Neurological:  Positive for numbness.        Tingling    All other systems reviewed and are negative.    Objective   BP  "120/82 (BP Location: Left arm, Patient Position: Sitting, BP Cuff Size: Adult)   Pulse 81   Ht 1.727 m (5' 8\")   Wt 105 kg (232 lb)   BMI 35.28 kg/m²   Physical Exam  Vitals and nursing note reviewed.   Constitutional:       General: She is not in acute distress.     Appearance: Normal appearance. She is obese.   HENT:      Head: Normocephalic and atraumatic.      Nose: Nose normal.      Mouth/Throat:      Mouth: Mucous membranes are moist.   Eyes:      Extraocular Movements: Extraocular movements intact.   Cardiovascular:      Rate and Rhythm: Normal rate and regular rhythm.   Pulmonary:      Effort: Pulmonary effort is normal.      Breath sounds: Normal breath sounds.   Musculoskeletal:         General: Normal range of motion.   Skin:     General: Skin is warm.      Findings: Bruising present.   Neurological:      Mental Status: She is alert and oriented to person, place, and time.   Psychiatric:         Mood and Affect: Mood normal.         Lab Review  Glucose (mg/dL)   Date Value   01/06/2024 123 (H)   05/22/2023 117 (H)   02/08/2023 68 (L)   10/31/2022 187 (H)     POC HEMOGLOBIN A1c (%)   Date Value   06/20/2024 6.7 (A)   12/12/2023 6.7 (A)     Hemoglobin A1C (%)   Date Value   02/08/2023 8.4 (A)   10/31/2022 8.1 (A)   06/22/2022 9.0 (A)     Bicarbonate (mmol/L)   Date Value   01/06/2024 24   05/22/2023 25   02/08/2023 28   10/31/2022 25     Urea Nitrogen (mg/dL)   Date Value   01/06/2024 13   05/22/2023 9   02/08/2023 11   10/31/2022 12     Creatinine (mg/dL)   Date Value   01/06/2024 0.94   05/22/2023 0.76   02/08/2023 0.69   10/31/2022 0.67     Lab Results   Component Value Date    CHOL 160 01/06/2024    CHOL 148 02/08/2023    CHOL 175 10/31/2022     Lab Results   Component Value Date    HDL 35.1 01/06/2024    HDL 29.9 (A) 02/08/2023    HDL 28.8 (A) 10/31/2022     Lab Results   Component Value Date    LDLCALC 80 01/06/2024     Lab Results   Component Value Date    TRIG 225 (H) 01/06/2024    TRIG 227 (H) " 02/08/2023    TRIG 382 (H) 10/31/2022       Health Maintenance:   Foot Exam: June 20, 2023  Eye Exam:  July 11, 2022  Urine Albumin:  February 8, 2023    Assessment/Plan   38-year-old female presents for follow up for type 2 diabetes.  Her blood pressure is at goal today. She is noted to be on a statin. She is noted to be on an ACE inhibitor.  She has been disciplined with lifestyle efforts.      Type 2 diabetes mellitus (Multi)  To continue Humulin R U500 105 units SQ before breakfast  To continue Mounjaro 15 mg SQ once weekly (currently on backorder)  To commence Ozempic as follows since Mounjaro is on backorder   0.25mg SQ once weekly for the first four weeks, then   0.5mg SQ weekly for four weeks, then  1mg SQ weekly for four weeks, and then  2mg SQ weekly   Counseled to continue an exercise regimen to include 150 minutes of moderate intensity exercise per week of moderate intensity    The A1C is at goal       Long-term insulin use (Multi)  Please rotate insulin injection sites    For follow up in 6 months

## 2024-06-24 DIAGNOSIS — E78.5 HYPERLIPIDEMIA, UNSPECIFIED HYPERLIPIDEMIA TYPE: ICD-10-CM

## 2024-06-24 DIAGNOSIS — I10 HYPERTENSION, UNSPECIFIED TYPE: ICD-10-CM

## 2024-06-25 RX ORDER — FENOFIBRATE 145 MG/1
TABLET, FILM COATED ORAL
Qty: 90 TABLET | Refills: 3 | Status: SHIPPED | OUTPATIENT
Start: 2024-06-25

## 2024-06-25 RX ORDER — LOSARTAN POTASSIUM 100 MG/1
TABLET ORAL
Qty: 90 TABLET | Refills: 3 | Status: SHIPPED | OUTPATIENT
Start: 2024-06-25

## 2024-06-26 NOTE — ASSESSMENT & PLAN NOTE
To continue Humulin R U500 105 units SQ before breakfast  To continue Mounjaro 15 mg SQ once weekly (currently on backorder)  To commence Ozempic as follows since Mounjaro is on backorder   0.25mg SQ once weekly for the first four weeks, then   0.5mg SQ weekly for four weeks, then  1mg SQ weekly for four weeks, and then  2mg SQ weekly   Counseled to continue an exercise regimen to include 150 minutes of moderate intensity exercise per week of moderate intensity    The A1C is at goal

## 2024-06-27 DIAGNOSIS — E11.9 TYPE 2 DIABETES MELLITUS WITHOUT COMPLICATION, WITH LONG-TERM CURRENT USE OF INSULIN (MULTI): ICD-10-CM

## 2024-06-27 DIAGNOSIS — Z79.4 TYPE 2 DIABETES MELLITUS WITHOUT COMPLICATION, WITH LONG-TERM CURRENT USE OF INSULIN (MULTI): ICD-10-CM

## 2024-06-27 RX ORDER — SEMAGLUTIDE 0.68 MG/ML
0.5 INJECTION, SOLUTION SUBCUTANEOUS
Qty: 9 ML | Refills: 1 | Status: SHIPPED | OUTPATIENT
Start: 2024-06-27 | End: 2024-12-24

## 2024-06-27 RX ORDER — PEN NEEDLE, DIABETIC 30 GX3/16"
NEEDLE, DISPOSABLE MISCELLANEOUS
Qty: 200 EACH | Refills: 3 | Status: SHIPPED | OUTPATIENT
Start: 2024-06-27

## 2024-06-27 RX ORDER — INSULIN HUMAN 500 [IU]/ML
INJECTION, SOLUTION SUBCUTANEOUS
Qty: 3 EACH | Refills: 3 | Status: SHIPPED | OUTPATIENT
Start: 2024-06-27

## 2024-06-27 NOTE — TELEPHONE ENCOUNTER
Spoke with [facility] clarification needed for ozempic; confirmed it is to be dispensed as 0.25mg weekly for 4 weeks. They request that 0.5mg rx be sent for future use.     Pen needles and humulin has also been pended to be sent to [facility].

## 2024-06-27 NOTE — TELEPHONE ENCOUNTER
Denise NICHOLSON [facility] Clinical Support Staff (supporting Hailey Aaron MD)         6/27/24  6:52 AM  Good morning! [facility] has the ozempic on hold waiting to hear back from you regarding the days supply it was written for. They said the way the RX is written is for 42 days so they just need clarification.       Also I requested to have the humilin and pen needle tips filled by [facility] home delivery also if you can please send in prescriptions for those.      Thank you so much!

## 2024-08-20 DIAGNOSIS — E11.9 TYPE 2 DIABETES MELLITUS WITHOUT COMPLICATION, WITH LONG-TERM CURRENT USE OF INSULIN (MULTI): ICD-10-CM

## 2024-08-20 DIAGNOSIS — Z79.4 TYPE 2 DIABETES MELLITUS WITHOUT COMPLICATION, WITH LONG-TERM CURRENT USE OF INSULIN (MULTI): ICD-10-CM

## 2024-08-21 RX ORDER — CALCIUM CITRATE/VITAMIN D3 200MG-6.25
1 TABLET ORAL 3 TIMES DAILY
Qty: 300 STRIP | Refills: 3 | Status: SHIPPED | OUTPATIENT
Start: 2024-08-21 | End: 2025-08-21

## 2024-08-26 DIAGNOSIS — E11.9 TYPE 2 DIABETES MELLITUS WITHOUT COMPLICATION, UNSPECIFIED WHETHER LONG TERM INSULIN USE (MULTI): Primary | ICD-10-CM

## 2024-08-26 RX ORDER — INSULIN PUMP SYRINGE, 3 ML
1 EACH MISCELLANEOUS AS NEEDED
COMMUNITY
End: 2024-08-26 | Stop reason: SDUPTHER

## 2024-08-26 RX ORDER — INSULIN PUMP SYRINGE, 3 ML
1 EACH MISCELLANEOUS 2 TIMES DAILY
Qty: 1 EACH | Refills: 0 | Status: SHIPPED | OUTPATIENT
Start: 2024-08-26 | End: 2025-08-26

## 2024-08-26 RX ORDER — BLOOD SUGAR DIAGNOSTIC
1 STRIP MISCELLANEOUS 2 TIMES DAILY
Qty: 200 STRIP | Refills: 3 | Status: SHIPPED | OUTPATIENT
Start: 2024-08-26 | End: 2025-08-26

## 2024-08-26 RX ORDER — BLOOD SUGAR DIAGNOSTIC
STRIP MISCELLANEOUS
COMMUNITY
End: 2024-08-26 | Stop reason: SDUPTHER

## 2024-08-26 NOTE — TELEPHONE ENCOUNTER
Patient insurance is no longer covering the true metrix, preferred meter and strips is now contour next.  Please send to [facility]  (Patient notified)

## 2024-09-04 ENCOUNTER — TELEPHONE (OUTPATIENT)
Dept: PRIMARY CARE | Facility: CLINIC | Age: 39
End: 2024-09-04
Payer: COMMERCIAL

## 2024-09-04 NOTE — TELEPHONE ENCOUNTER
Arms have a tingling and numb feeling,  heaviness in chest and stabbing pain back shoulder blade area. Scheduled for first available but that is not until 9/11/24 please advise

## 2024-09-10 ASSESSMENT — ENCOUNTER SYMPTOMS
ALTERED MENTAL STATUS: 0
MEMORY LOSS: 1
VERTIGO: 0
FOCAL SENSORY LOSS: 1
PALPITATIONS: 1
FEVER: 0
CLUMSINESS: 1
DIAPHORESIS: 0
NAUSEA: 0
HEADACHES: 0
FATIGUE: 1
WEAKNESS: 1
LIGHT-HEADEDNESS: 1
ABDOMINAL PAIN: 0
VISUAL CHANGE: 0
CONFUSION: 0
BOWEL INCONTINENCE: 0
DIZZINESS: 1
VOMITING: 0
SHORTNESS OF BREATH: 1
NEUROLOGIC COMPLAINT: 1
AURA: 0
SLURRED SPEECH: 0
FOCAL WEAKNESS: 1
NEAR-SYNCOPE: 0
LOSS OF BALANCE: 0
NECK PAIN: 1
BACK PAIN: 1

## 2024-09-11 ENCOUNTER — APPOINTMENT (OUTPATIENT)
Dept: PRIMARY CARE | Facility: CLINIC | Age: 39
End: 2024-09-11
Payer: COMMERCIAL

## 2024-09-11 ENCOUNTER — HOSPITAL ENCOUNTER (OUTPATIENT)
Dept: RADIOLOGY | Facility: HOSPITAL | Age: 39
Discharge: HOME | End: 2024-09-11
Payer: COMMERCIAL

## 2024-09-11 ENCOUNTER — LAB (OUTPATIENT)
Dept: LAB | Facility: LAB | Age: 39
End: 2024-09-11
Payer: COMMERCIAL

## 2024-09-11 ENCOUNTER — TELEPHONE (OUTPATIENT)
Dept: PRIMARY CARE | Facility: CLINIC | Age: 39
End: 2024-09-11

## 2024-09-11 VITALS
HEART RATE: 88 BPM | DIASTOLIC BLOOD PRESSURE: 66 MMHG | SYSTOLIC BLOOD PRESSURE: 122 MMHG | WEIGHT: 237 LBS | OXYGEN SATURATION: 95 % | HEIGHT: 68 IN | BODY MASS INDEX: 35.92 KG/M2

## 2024-09-11 DIAGNOSIS — R07.89 ATYPICAL CHEST PAIN: Primary | ICD-10-CM

## 2024-09-11 DIAGNOSIS — R06.02 SHORTNESS OF BREATH: ICD-10-CM

## 2024-09-11 DIAGNOSIS — R20.0 NUMBNESS AND TINGLING IN BOTH HANDS: ICD-10-CM

## 2024-09-11 DIAGNOSIS — R20.2 NUMBNESS AND TINGLING IN BOTH HANDS: ICD-10-CM

## 2024-09-11 DIAGNOSIS — R53.83 OTHER FATIGUE: ICD-10-CM

## 2024-09-11 DIAGNOSIS — R07.89 ATYPICAL CHEST PAIN: ICD-10-CM

## 2024-09-11 DIAGNOSIS — J39.8 CONGESTION OF UPPER RESPIRATORY TRACT: ICD-10-CM

## 2024-09-11 DIAGNOSIS — R63.5 WEIGHT GAIN: ICD-10-CM

## 2024-09-11 LAB
ALBUMIN SERPL BCP-MCNC: 4.4 G/DL (ref 3.4–5)
ALP SERPL-CCNC: 48 U/L (ref 33–110)
ALT SERPL W P-5'-P-CCNC: 15 U/L (ref 7–45)
ANION GAP SERPL CALC-SCNC: 13 MMOL/L (ref 10–20)
AST SERPL W P-5'-P-CCNC: 18 U/L (ref 9–39)
BASOPHILS # BLD AUTO: 0.1 X10*3/UL (ref 0–0.1)
BASOPHILS NFR BLD AUTO: 0.9 %
BILIRUB SERPL-MCNC: 0.3 MG/DL (ref 0–1.2)
BUN SERPL-MCNC: 11 MG/DL (ref 6–23)
CALCIUM SERPL-MCNC: 9 MG/DL (ref 8.6–10.3)
CHLORIDE SERPL-SCNC: 103 MMOL/L (ref 98–107)
CO2 SERPL-SCNC: 25 MMOL/L (ref 21–32)
CORTIS SERPL-MCNC: 11.5 UG/DL (ref 2.5–20)
CREAT SERPL-MCNC: 0.74 MG/DL (ref 0.5–1.05)
EGFRCR SERPLBLD CKD-EPI 2021: >90 ML/MIN/1.73M*2
EOSINOPHIL # BLD AUTO: 0.27 X10*3/UL (ref 0–0.7)
EOSINOPHIL NFR BLD AUTO: 2.3 %
ERYTHROCYTE [DISTWIDTH] IN BLOOD BY AUTOMATED COUNT: 13.2 % (ref 11.5–14.5)
FSH SERPL-ACNC: 3.7 IU/L
GLUCOSE SERPL-MCNC: 141 MG/DL (ref 74–99)
HCT VFR BLD AUTO: 42.5 % (ref 36–46)
HGB BLD-MCNC: 14.2 G/DL (ref 12–16)
IMM GRANULOCYTES # BLD AUTO: 0.06 X10*3/UL (ref 0–0.7)
IMM GRANULOCYTES NFR BLD AUTO: 0.5 % (ref 0–0.9)
INSULIN P FAST SERPL-ACNC: 193 UIU/ML (ref 3–25)
LH SERPL-ACNC: 7.1 IU/L
LYMPHOCYTES # BLD AUTO: 3.76 X10*3/UL (ref 1.2–4.8)
LYMPHOCYTES NFR BLD AUTO: 32.4 %
MCH RBC QN AUTO: 29.2 PG (ref 26–34)
MCHC RBC AUTO-ENTMCNC: 33.4 G/DL (ref 32–36)
MCV RBC AUTO: 87 FL (ref 80–100)
MONOCYTES # BLD AUTO: 0.89 X10*3/UL (ref 0.1–1)
MONOCYTES NFR BLD AUTO: 7.7 %
NEUTROPHILS # BLD AUTO: 6.52 X10*3/UL (ref 1.2–7.7)
NEUTROPHILS NFR BLD AUTO: 56.2 %
NRBC BLD-RTO: 0 /100 WBCS (ref 0–0)
PLATELET # BLD AUTO: 430 X10*3/UL (ref 150–450)
POTASSIUM SERPL-SCNC: 4.3 MMOL/L (ref 3.5–5.3)
PROGEST SERPL-MCNC: 6.5 NG/ML
PROT SERPL-MCNC: 6.5 G/DL (ref 6.4–8.2)
RBC # BLD AUTO: 4.86 X10*6/UL (ref 4–5.2)
SODIUM SERPL-SCNC: 137 MMOL/L (ref 136–145)
TSH SERPL-ACNC: 2.21 MIU/L (ref 0.44–3.98)
WBC # BLD AUTO: 11.6 X10*3/UL (ref 4.4–11.3)

## 2024-09-11 PROCEDURE — 83002 ASSAY OF GONADOTROPIN (LH): CPT

## 2024-09-11 PROCEDURE — 93000 ELECTROCARDIOGRAM COMPLETE: CPT

## 2024-09-11 PROCEDURE — 84402 ASSAY OF FREE TESTOSTERONE: CPT

## 2024-09-11 PROCEDURE — 4010F ACE/ARB THERAPY RXD/TAKEN: CPT

## 2024-09-11 PROCEDURE — 85025 COMPLETE CBC W/AUTO DIFF WBC: CPT

## 2024-09-11 PROCEDURE — 3008F BODY MASS INDEX DOCD: CPT

## 2024-09-11 PROCEDURE — 84144 ASSAY OF PROGESTERONE: CPT

## 2024-09-11 PROCEDURE — 83001 ASSAY OF GONADOTROPIN (FSH): CPT

## 2024-09-11 PROCEDURE — 99214 OFFICE O/P EST MOD 30 MIN: CPT

## 2024-09-11 PROCEDURE — 71046 X-RAY EXAM CHEST 2 VIEWS: CPT | Performed by: RADIOLOGY

## 2024-09-11 PROCEDURE — 3048F LDL-C <100 MG/DL: CPT

## 2024-09-11 PROCEDURE — 80053 COMPREHEN METABOLIC PANEL: CPT

## 2024-09-11 PROCEDURE — 84443 ASSAY THYROID STIM HORMONE: CPT

## 2024-09-11 PROCEDURE — 82672 ASSAY OF ESTROGEN: CPT

## 2024-09-11 PROCEDURE — 82533 TOTAL CORTISOL: CPT

## 2024-09-11 PROCEDURE — 3078F DIAST BP <80 MM HG: CPT

## 2024-09-11 PROCEDURE — 1036F TOBACCO NON-USER: CPT

## 2024-09-11 PROCEDURE — 83525 ASSAY OF INSULIN: CPT

## 2024-09-11 PROCEDURE — 36415 COLL VENOUS BLD VENIPUNCTURE: CPT

## 2024-09-11 PROCEDURE — 71046 X-RAY EXAM CHEST 2 VIEWS: CPT

## 2024-09-11 PROCEDURE — 3074F SYST BP LT 130 MM HG: CPT

## 2024-09-11 RX ORDER — METHYLPREDNISOLONE 4 MG/1
TABLET ORAL
Qty: 21 TABLET | Refills: 0 | Status: SHIPPED | OUTPATIENT
Start: 2024-09-11 | End: 2024-09-18

## 2024-09-11 ASSESSMENT — ENCOUNTER SYMPTOMS
HEADACHES: 0
FOCAL WEAKNESS: 1
FEVER: 0
SLURRED SPEECH: 0
ABDOMINAL PAIN: 0
OCCASIONAL FEELINGS OF UNSTEADINESS: 0
LOSS OF SENSATION IN FEET: 0
CONFUSION: 0
VISUAL CHANGE: 0
DIZZINESS: 1
NECK PAIN: 1
PALPITATIONS: 1
NEUROLOGIC COMPLAINT: 1
BACK PAIN: 1
VERTIGO: 0
WEAKNESS: 1
AURA: 0
VOMITING: 0
NAUSEA: 0
FOCAL SENSORY LOSS: 1
FATIGUE: 1
LIGHT-HEADEDNESS: 1
LOSS OF BALANCE: 0
DIAPHORESIS: 0
DEPRESSION: 0
CLUMSINESS: 1
SHORTNESS OF BREATH: 1
MEMORY LOSS: 1
NEAR-SYNCOPE: 0
BOWEL INCONTINENCE: 0
ALTERED MENTAL STATUS: 0

## 2024-09-11 ASSESSMENT — COLUMBIA-SUICIDE SEVERITY RATING SCALE - C-SSRS
2. HAVE YOU ACTUALLY HAD ANY THOUGHTS OF KILLING YOURSELF?: NO
6. HAVE YOU EVER DONE ANYTHING, STARTED TO DO ANYTHING, OR PREPARED TO DO ANYTHING TO END YOUR LIFE?: NO
1. IN THE PAST MONTH, HAVE YOU WISHED YOU WERE DEAD OR WISHED YOU COULD GO TO SLEEP AND NOT WAKE UP?: NO

## 2024-09-11 ASSESSMENT — PATIENT HEALTH QUESTIONNAIRE - PHQ9
2. FEELING DOWN, DEPRESSED OR HOPELESS: NOT AT ALL
1. LITTLE INTEREST OR PLEASURE IN DOING THINGS: NEARLY EVERY DAY
SUM OF ALL RESPONSES TO PHQ9 QUESTIONS 1 AND 2: 3
10. IF YOU CHECKED OFF ANY PROBLEMS, HOW DIFFICULT HAVE THESE PROBLEMS MADE IT FOR YOU TO DO YOUR WORK, TAKE CARE OF THINGS AT HOME, OR GET ALONG WITH OTHER PEOPLE: NOT DIFFICULT AT ALL

## 2024-09-11 NOTE — PROGRESS NOTES
Subjective   Patient ID: Denise Kurtz is a 38 y.o. female who presents for Follow-up (Pt here for arms and tingling and numb and painful, heaviness in chest and stabbing pain back shoulder blade area, has been about 3 weeks.  Left upper quad tenderness.).    Past Medical, Surgical, and Family History reviewed and updated in chart.     Reviewed all medications by prescribing practitioner or clinical pharmacist (such as prescriptions, OTCs, herbal therapies and supplements) and documented in the medical record.    Acute Neurological Problem  The patient's primary symptoms include clumsiness, focal sensory loss, focal weakness, memory loss and weakness. The patient's pertinent negatives include no altered mental status, loss of balance, near-syncope, slurred speech, syncope or visual change. This is a new problem. The current episode started 1 to 4 weeks ago. The neurological problem developed gradually. The problem has been waxing and waning since onset. There was left-sided, lower extremity and upper extremity focality noted. Associated symptoms include back pain, chest pain, dizziness, fatigue, light-headedness, neck pain, palpitations and shortness of breath. Pertinent negatives include no abdominal pain, auditory change, aura, bladder incontinence, bowel incontinence, confusion, diaphoresis, fever, headaches, nausea, vertigo or vomiting. Past treatments include acetaminophen, bed rest, neck support, position change, sleep and walking. The treatment provided mild relief.      Patient in office for follow up.  Has been having Bilateral arms numbness and tingling for the past three weeks.  Has this in 2022 has  Thoracic outlet surgery when had this similar sensations. That helped the feeling and now it is back. Denies change, or injury.  Also reports having heaviness in chest with pain that radiates under right arm, dizziness.  States feels the same way she did after surgery in 2022 when had a lung that collapsed.  " Depends shortness of breath, difficulty taking a deep breath.  Pain comes and goes for the past three weeks.     Review of Systems   Constitutional:  Positive for fatigue. Negative for diaphoresis and fever.   Respiratory:  Positive for shortness of breath.    Cardiovascular:  Positive for chest pain and palpitations. Negative for near-syncope.   Gastrointestinal:  Negative for abdominal pain, bowel incontinence, nausea and vomiting.   Genitourinary:  Negative for bladder incontinence.   Musculoskeletal:  Positive for back pain and neck pain.   Neurological:  Positive for dizziness, focal weakness, weakness and light-headedness. Negative for vertigo, syncope, headaches and loss of balance.   Psychiatric/Behavioral:  Positive for memory loss. Negative for confusion.        Objective   /66 (BP Location: Right arm, Patient Position: Sitting)   Pulse 88   Ht 1.727 m (5' 8\")   Wt 108 kg (237 lb)   SpO2 95%   BMI 36.04 kg/m²     Physical Exam  Constitutional:       Appearance: Normal appearance.   HENT:      Head: Normocephalic and atraumatic.      Nose: Nose normal.      Mouth/Throat:      Mouth: Mucous membranes are moist.      Pharynx: Oropharynx is clear.   Eyes:      Pupils: Pupils are equal, round, and reactive to light.   Cardiovascular:      Rate and Rhythm: Normal rate and regular rhythm.      Pulses: Normal pulses.      Heart sounds: Normal heart sounds.   Pulmonary:      Effort: Pulmonary effort is normal.      Breath sounds: Normal breath sounds.   Abdominal:      General: Bowel sounds are normal.      Palpations: Abdomen is soft.   Musculoskeletal:         General: Normal range of motion.      Cervical back: Normal range of motion.   Skin:     General: Skin is warm and dry.   Neurological:      General: No focal deficit present.      Mental Status: She is alert and oriented to person, place, and time.   Psychiatric:         Mood and Affect: Mood normal.         Behavior: Behavior normal.         " Thought Content: Thought content normal.         Judgment: Judgment normal.         Assessment/Plan   Problem List Items Addressed This Visit       Atypical chest pain - Primary    Relevant Orders    ECG 12 lead (Clinic Performed)    XR chest 2 views    Fatigue    Relevant Orders    FSH    Luteinizing hormone    Cortisol    TSH with reflex to Free T4 if abnormal    Insulin, Fasting    Comprehensive metabolic panel    Testosterone, total and free    Estrogens, Total    Progesterone    CBC and Auto Differential    Dyspnea    Relevant Medications    methylPREDNISolone (Medrol Dospak) 4 mg tablets    Other Relevant Orders    XR chest 2 views     Other Visit Diagnoses       Numbness and tingling in both hands        Relevant Orders    EMG & nerve conduction    Weight gain        Congestion of upper respiratory tract        Relevant Medications    methylPREDNISolone (Medrol Dospak) 4 mg tablets          EKG normal sinus rhythm. Will order stat chest xray. Lungs are clear.  Patient is stable. Lab work ordered for hormones also per patients request. And steroid sent, states last time she had this feeling it helped.  Will get EMG testing for numbness and tingling.    Patient educated on if symptoms worsen, SOB, trouble breathing, chest pain, chest tightness.

## 2024-09-11 NOTE — TELEPHONE ENCOUNTER
----- Message from Staci Bacon sent at 9/11/2024  1:46 PM EDT -----  Xray is normal, continue with steroid.  If does not help or gets worse let me know.

## 2024-09-11 NOTE — PATIENT INSTRUCTIONS
Assessment/Plan   Problem List Items Addressed This Visit       Atypical chest pain - Primary    Relevant Orders    ECG 12 lead (Clinic Performed)    XR chest 2 views    Fatigue    Relevant Orders    FSH    Luteinizing hormone    Cortisol    TSH with reflex to Free T4 if abnormal    Insulin, Fasting    Comprehensive metabolic panel    Testosterone, total and free    Estrogens, Total    Progesterone    CBC and Auto Differential    Dyspnea    Relevant Medications    methylPREDNISolone (Medrol Dospak) 4 mg tablets    Other Relevant Orders    XR chest 2 views     Other Visit Diagnoses       Numbness and tingling in both hands        Relevant Orders    EMG & nerve conduction    Weight gain        Congestion of upper respiratory tract        Relevant Medications    methylPREDNISolone (Medrol Dospak) 4 mg tablets          EKG normal sinus rhythm. Will order stat chest xray. Lungs are clear.  Patient is stable. Lab work ordered for hormones also per patients request. And steroid sent, states last time she had this feeling it helped.  Will get EMG testing for numbness and tingling.    Patient educated on if symptoms worsen, SOB, trouble breathing, chest pain, chest tightness.

## 2024-09-16 LAB
TESTOSTERONE FREE (CHAN): 5.3 PG/ML (ref 0.1–6.4)
TESTOSTERONE,TOTAL,LC-MS/MS: 45 NG/DL (ref 2–45)

## 2024-09-17 ENCOUNTER — PATIENT MESSAGE (OUTPATIENT)
Dept: ENDOCRINOLOGY | Facility: CLINIC | Age: 39
End: 2024-09-17
Payer: COMMERCIAL

## 2024-09-17 DIAGNOSIS — E11.9 TYPE 2 DIABETES MELLITUS WITHOUT COMPLICATION, UNSPECIFIED WHETHER LONG TERM INSULIN USE (MULTI): Primary | ICD-10-CM

## 2024-09-18 LAB — ESTROGEN SERPL-MCNC: 244 PG/ML

## 2024-09-20 ENCOUNTER — TELEPHONE (OUTPATIENT)
Dept: PRIMARY CARE | Facility: CLINIC | Age: 39
End: 2024-09-20
Payer: COMMERCIAL

## 2024-09-20 DIAGNOSIS — M62.838 MUSCLE SPASM: Primary | ICD-10-CM

## 2024-09-20 NOTE — TELEPHONE ENCOUNTER
----- Message from Staci Bacon sent at 9/20/2024 11:16 AM EDT -----  Fasting insulin level is high, this shows you have some insulin resistance where your body doesn't respond properly to the insulin it produces. Different reasons would be lifestyle, diet, exercise, high BMI. Other hormone levels are WNL. We could try metformin to help with this fasting insulting numbers to bring this down to help your body respond better.  Also with diet changed low carb and sugar intake, exercise daily.

## 2024-09-20 NOTE — RESULT ENCOUNTER NOTE
Fasting insulin level is high, this shows you have some insulin resistance where your body doesn't respond properly to the insulin it produces. Different reasons would be lifestyle, diet, exercise, high BMI. Other hormone levels are WNL. We could try metformin to help with this fasting insulting numbers to bring this down to help your body respond better.  Also with diet changed low carb and sugar intake, exercise daily.

## 2024-09-23 RX ORDER — CYCLOBENZAPRINE HCL 5 MG
5 TABLET ORAL 3 TIMES DAILY PRN
Qty: 30 TABLET | Refills: 0 | Status: SHIPPED | OUTPATIENT
Start: 2024-09-23 | End: 2024-11-22

## 2024-09-23 RX ORDER — SEMAGLUTIDE 1.34 MG/ML
1 INJECTION, SOLUTION SUBCUTANEOUS
Qty: 3 ML | Refills: 0 | Status: SHIPPED | OUTPATIENT
Start: 2024-09-23 | End: 2024-10-23

## 2024-09-23 RX ORDER — SEMAGLUTIDE 2.68 MG/ML
2 INJECTION, SOLUTION SUBCUTANEOUS
Qty: 3 ML | Refills: 4 | Status: SHIPPED | OUTPATIENT
Start: 2024-09-23 | End: 2025-03-22

## 2024-09-30 DIAGNOSIS — F41.9 ANXIETY: ICD-10-CM

## 2024-10-01 RX ORDER — DULOXETIN HYDROCHLORIDE 60 MG/1
CAPSULE, DELAYED RELEASE ORAL
Qty: 60 CAPSULE | Refills: 11 | Status: SHIPPED | OUTPATIENT
Start: 2024-10-01

## 2024-10-09 ENCOUNTER — APPOINTMENT (OUTPATIENT)
Dept: NEUROLOGY | Facility: CLINIC | Age: 39
End: 2024-10-09
Payer: COMMERCIAL

## 2024-10-23 DIAGNOSIS — M54.16 LUMBAR RADICULOPATHY: Primary | ICD-10-CM

## 2024-10-23 DIAGNOSIS — M54.6 THORACIC SPINE PAIN: ICD-10-CM

## 2024-10-23 DIAGNOSIS — R20.0 NUMBNESS AND TINGLING IN LEFT ARM: Primary | ICD-10-CM

## 2024-10-23 DIAGNOSIS — R20.2 NUMBNESS AND TINGLING IN LEFT ARM: Primary | ICD-10-CM

## 2024-10-23 DIAGNOSIS — M54.2 CERVICAL SPINE PAIN: ICD-10-CM

## 2024-10-23 RX ORDER — HYDROCODONE BITARTRATE AND ACETAMINOPHEN 5; 325 MG/1; MG/1
1 TABLET ORAL EVERY 6 HOURS PRN
Qty: 20 TABLET | Refills: 0 | Status: SHIPPED | OUTPATIENT
Start: 2024-10-23 | End: 2024-10-30

## 2024-10-23 NOTE — PROGRESS NOTES
Patient reports worsening numbness and tingling to right side, making it difficult to move. Causes panic with SOB. Is currently using muscle relaxer's, duloxetine 60mg.  Awaiting EMG appointment. Will order CT scan due to increase with pain and numbness.

## 2024-11-07 ENCOUNTER — HOSPITAL ENCOUNTER (OUTPATIENT)
Dept: NEUROLOGY | Facility: CLINIC | Age: 39
Discharge: HOME | End: 2024-11-07
Payer: COMMERCIAL

## 2024-11-07 DIAGNOSIS — R20.2 NUMBNESS AND TINGLING IN BOTH HANDS: ICD-10-CM

## 2024-11-07 DIAGNOSIS — R20.0 NUMBNESS AND TINGLING IN BOTH HANDS: ICD-10-CM

## 2024-11-07 PROCEDURE — 95885 MUSC TST DONE W/NERV TST LIM: CPT | Mod: 59 | Performed by: PSYCHIATRY & NEUROLOGY

## 2024-11-07 PROCEDURE — 95886 MUSC TEST DONE W/N TEST COMP: CPT | Performed by: PSYCHIATRY & NEUROLOGY

## 2024-11-07 PROCEDURE — 95912 NRV CNDJ TEST 11-12 STUDIES: CPT | Performed by: PSYCHIATRY & NEUROLOGY

## 2024-11-08 ENCOUNTER — TELEPHONE (OUTPATIENT)
Dept: PRIMARY CARE | Facility: CLINIC | Age: 39
End: 2024-11-08
Payer: COMMERCIAL

## 2024-11-08 NOTE — RESULT ENCOUNTER NOTE
EMG showing mild carpal tunnel syndrome, not changes form the last EMG. I can refer to ortho if you would like?

## 2024-11-08 NOTE — TELEPHONE ENCOUNTER
----- Message from Staci Bacon sent at 11/8/2024 12:19 PM EST -----  EMG showing mild carpal tunnel syndrome, not changes form the last EMG. I can refer to ortho if you would like?

## 2024-11-11 DIAGNOSIS — R10.9 LEFT SIDED ABDOMINAL PAIN: Primary | ICD-10-CM

## 2024-11-29 ENCOUNTER — APPOINTMENT (OUTPATIENT)
Dept: RADIOLOGY | Facility: HOSPITAL | Age: 39
End: 2024-11-29
Payer: COMMERCIAL

## 2024-12-03 NOTE — PATIENT INSTRUCTIONS
Thank you for choosing [facility]  for your health care needs.  If you have any questions, concerns or medical needs, please feel free to contact our office at [phone].    Please ensure you complete your blood work one week before the next scheduled appointment.    To continue Humulin R U500 105 units SQ before breakfast  To continue Ozempic 2mg SQ weekly   To obtain fasting blood and urine tests   For follow up in 6 months

## 2024-12-03 NOTE — PROGRESS NOTES
"Subjective   Denise Kurtz is a 39 y.o. female who presents for follow-up of Type 2 diabetes mellitus. The initial diagnosis of diabetes was made in 2013 . She denies GDM with three children (two babies over 9 lbs)    She has been less active since September as she has left flank pain.  Her PCP ordered CT scans which she obtained today.      Known complications due to diabetes included obesity.      Cardiovascular risk factors include diabetes mellitus and obesity (BMI >= 30 kg/m2). The patient is on an ACE inhibitor or angiotensin II receptor blocker.  The patient has not been previously hospitalized due to diabetic ketoacidosis.     Current symptoms/problems include none. Her clinical course has been stable.     The patient is currently checking the blood glucose.      Patient is using: glucometer  Can be has high as 190mg/dL  Fasting 80s  Today 156mg/dL     Hypoglycemia frequency: Denies   Hypoglycemia awareness: Yes     Current Medication Regimen  Humulin U500 105 units in the morning  Ozempic 2mg SQ once weekly       MEALS:   Breakfast - bananan, eggs   Lunch - potato soup   Dinner - chicken and broccoli, brown rice, teriyaki chicken, carrror, roll   Beverages - decaf coffee, water, Vitamin water     Exercise regimen - walks twice per week or treadmill twice per week   She was previously walking her 4 dogs separately     Family history  Father- type 2 DM     Review of Systems   Constitutional:  Positive for fatigue.   Respiratory:  Positive for shortness of breath.    Gastrointestinal:  Positive for nausea. Negative for constipation.   Genitourinary:  Negative for difficulty urinating and flank pain.   Musculoskeletal:  Positive for back pain.   Psychiatric/Behavioral:  Positive for sleep disturbance.    All other systems reviewed and are negative.    Objective   /90 (BP Location: Left arm, Patient Position: Sitting, BP Cuff Size: Adult)   Pulse 89   Ht 1.727 m (5' 8\")   Wt 108 kg (239 lb)   BMI " 36.34 kg/m²   Physical Exam  Vitals and nursing note reviewed.   Constitutional:       General: She is not in acute distress.     Appearance: Normal appearance. She is obese.   HENT:      Head: Normocephalic and atraumatic.      Nose: Nose normal.      Mouth/Throat:      Mouth: Mucous membranes are moist.   Eyes:      Extraocular Movements: Extraocular movements intact.   Cardiovascular:      Rate and Rhythm: Normal rate and regular rhythm.   Pulmonary:      Effort: Pulmonary effort is normal.      Breath sounds: Normal breath sounds.   Musculoskeletal:         General: Normal range of motion.   Skin:     General: Skin is warm.   Neurological:      Mental Status: She is alert and oriented to person, place, and time.   Psychiatric:         Mood and Affect: Mood normal.         Lab Review  Glucose (mg/dL)   Date Value   09/11/2024 141 (H)   01/06/2024 123 (H)   05/22/2023 117 (H)   02/08/2023 68 (L)   10/31/2022 187 (H)     POC HEMOGLOBIN A1c (%)   Date Value   06/20/2024 6.7 (A)   12/12/2023 6.7 (A)     Hemoglobin A1C (%)   Date Value   02/08/2023 8.4 (A)   10/31/2022 8.1 (A)   06/22/2022 9.0 (A)     Bicarbonate (mmol/L)   Date Value   09/11/2024 25   01/06/2024 24   05/22/2023 25   02/08/2023 28   10/31/2022 25     Urea Nitrogen (mg/dL)   Date Value   09/11/2024 11   01/06/2024 13   05/22/2023 9   02/08/2023 11   10/31/2022 12     Creatinine (mg/dL)   Date Value   09/11/2024 0.74   01/06/2024 0.94   05/22/2023 0.76   02/08/2023 0.69   10/31/2022 0.67     Lab Results   Component Value Date    CHOL 160 01/06/2024    CHOL 148 02/08/2023    CHOL 175 10/31/2022     Lab Results   Component Value Date    HDL 35.1 01/06/2024    HDL 29.9 (A) 02/08/2023    HDL 28.8 (A) 10/31/2022     Lab Results   Component Value Date    LDLCALC 80 01/06/2024     Lab Results   Component Value Date    TRIG 225 (H) 01/06/2024    TRIG 227 (H) 02/08/2023    TRIG 382 (H) 10/31/2022       Health Maintenance:   Foot Exam: June 20, 2023  Eye Exam:   July 11, 2022  Urine Albumin:  February 8, 2023    Assessment/Plan   39-year-old female presents for follow up for type 2 diabetes.  Her blood pressure is elevated above goal today. She is noted to be on a statin    Type 2 diabetes mellitus (Multi)  To continue Humulin R U500 105 units SQ before breakfast  To continue Ozempic 2mg SQ weekly   To obtain fasting blood and urine tests     Left flank pain  To obtain UA, CBC, ESR and CRP   CT scans obtained today as ordered by PCP     For follow up in 6 months

## 2024-12-04 ENCOUNTER — HOSPITAL ENCOUNTER (OUTPATIENT)
Dept: RADIOLOGY | Facility: HOSPITAL | Age: 39
Discharge: HOME | End: 2024-12-04
Payer: COMMERCIAL

## 2024-12-04 ENCOUNTER — LAB (OUTPATIENT)
Dept: LAB | Facility: LAB | Age: 39
End: 2024-12-04
Payer: COMMERCIAL

## 2024-12-04 ENCOUNTER — APPOINTMENT (OUTPATIENT)
Dept: ENDOCRINOLOGY | Facility: CLINIC | Age: 39
End: 2024-12-04
Payer: COMMERCIAL

## 2024-12-04 VITALS
SYSTOLIC BLOOD PRESSURE: 142 MMHG | HEIGHT: 68 IN | HEART RATE: 89 BPM | BODY MASS INDEX: 36.22 KG/M2 | WEIGHT: 239 LBS | DIASTOLIC BLOOD PRESSURE: 90 MMHG

## 2024-12-04 DIAGNOSIS — R20.2 NUMBNESS AND TINGLING IN LEFT ARM: ICD-10-CM

## 2024-12-04 DIAGNOSIS — E11.9 TYPE 2 DIABETES MELLITUS WITHOUT COMPLICATION, UNSPECIFIED WHETHER LONG TERM INSULIN USE (MULTI): ICD-10-CM

## 2024-12-04 DIAGNOSIS — E11.9 TYPE 2 DIABETES MELLITUS WITHOUT COMPLICATION, WITH LONG-TERM CURRENT USE OF INSULIN (MULTI): ICD-10-CM

## 2024-12-04 DIAGNOSIS — R20.0 NUMBNESS AND TINGLING IN LEFT ARM: ICD-10-CM

## 2024-12-04 DIAGNOSIS — M54.6 THORACIC SPINE PAIN: ICD-10-CM

## 2024-12-04 DIAGNOSIS — E11.9 TYPE 2 DIABETES MELLITUS WITHOUT COMPLICATION, UNSPECIFIED WHETHER LONG TERM INSULIN USE (MULTI): Primary | ICD-10-CM

## 2024-12-04 DIAGNOSIS — Z79.4 TYPE 2 DIABETES MELLITUS WITHOUT COMPLICATION, WITH LONG-TERM CURRENT USE OF INSULIN (MULTI): ICD-10-CM

## 2024-12-04 DIAGNOSIS — R10.9 LEFT FLANK PAIN: ICD-10-CM

## 2024-12-04 DIAGNOSIS — M54.2 CERVICAL SPINE PAIN: ICD-10-CM

## 2024-12-04 DIAGNOSIS — R10.9 LEFT SIDED ABDOMINAL PAIN: ICD-10-CM

## 2024-12-04 LAB
ALBUMIN SERPL BCP-MCNC: 4.1 G/DL (ref 3.4–5)
ALP SERPL-CCNC: 51 U/L (ref 33–110)
ALT SERPL W P-5'-P-CCNC: 16 U/L (ref 7–45)
ANION GAP SERPL CALC-SCNC: 11 MMOL/L (ref 10–20)
APPEARANCE UR: CLEAR
AST SERPL W P-5'-P-CCNC: 20 U/L (ref 9–39)
BILIRUB SERPL-MCNC: 0.3 MG/DL (ref 0–1.2)
BILIRUB UR STRIP.AUTO-MCNC: NEGATIVE MG/DL
BUN SERPL-MCNC: 10 MG/DL (ref 6–23)
CALCIUM SERPL-MCNC: 9 MG/DL (ref 8.6–10.3)
CHLORIDE SERPL-SCNC: 105 MMOL/L (ref 98–107)
CHOLEST SERPL-MCNC: 137 MG/DL (ref 0–199)
CHOLESTEROL/HDL RATIO: 4.5
CO2 SERPL-SCNC: 26 MMOL/L (ref 21–32)
COLOR UR: NORMAL
CREAT SERPL-MCNC: 0.68 MG/DL (ref 0.5–1.05)
CREAT UR-MCNC: 49.1 MG/DL (ref 20–320)
CRP SERPL-MCNC: 0.2 MG/DL
EGFRCR SERPLBLD CKD-EPI 2021: >90 ML/MIN/1.73M*2
ERYTHROCYTE [DISTWIDTH] IN BLOOD BY AUTOMATED COUNT: 12.9 % (ref 11.5–14.5)
ERYTHROCYTE [SEDIMENTATION RATE] IN BLOOD BY WESTERGREN METHOD: 7 MM/H (ref 0–20)
GLUCOSE SERPL-MCNC: 87 MG/DL (ref 74–99)
GLUCOSE UR STRIP.AUTO-MCNC: NORMAL MG/DL
HCT VFR BLD AUTO: 45.2 % (ref 36–46)
HDLC SERPL-MCNC: 30.2 MG/DL
HGB BLD-MCNC: 15.5 G/DL (ref 12–16)
KETONES UR STRIP.AUTO-MCNC: NEGATIVE MG/DL
LDLC SERPL CALC-MCNC: 64 MG/DL
LEUKOCYTE ESTERASE UR QL STRIP.AUTO: NEGATIVE
MCH RBC QN AUTO: 29.5 PG (ref 26–34)
MCHC RBC AUTO-ENTMCNC: 34.3 G/DL (ref 32–36)
MCV RBC AUTO: 86 FL (ref 80–100)
MICROALBUMIN UR-MCNC: 10.9 MG/L
MICROALBUMIN/CREAT UR: 22.2 UG/MG CREAT
NITRITE UR QL STRIP.AUTO: NEGATIVE
NON HDL CHOLESTEROL: 107 MG/DL (ref 0–149)
NRBC BLD-RTO: 0 /100 WBCS (ref 0–0)
PH UR STRIP.AUTO: 5.5 [PH]
PLATELET # BLD AUTO: 479 X10*3/UL (ref 150–450)
POC FINGERSTICK BLOOD GLUCOSE: 86 MG/DL (ref 70–100)
POTASSIUM SERPL-SCNC: 4.1 MMOL/L (ref 3.5–5.3)
PROT SERPL-MCNC: 6 G/DL (ref 6.4–8.2)
PROT UR STRIP.AUTO-MCNC: NEGATIVE MG/DL
RBC # BLD AUTO: 5.26 X10*6/UL (ref 4–5.2)
RBC # UR STRIP.AUTO: NEGATIVE /UL
SODIUM SERPL-SCNC: 138 MMOL/L (ref 136–145)
SP GR UR STRIP.AUTO: 1.01
TRIGL SERPL-MCNC: 214 MG/DL (ref 0–149)
UROBILINOGEN UR STRIP.AUTO-MCNC: NORMAL MG/DL
VLDL: 43 MG/DL (ref 0–40)
WBC # BLD AUTO: 13.5 X10*3/UL (ref 4.4–11.3)

## 2024-12-04 PROCEDURE — 85652 RBC SED RATE AUTOMATED: CPT

## 2024-12-04 PROCEDURE — 80061 LIPID PANEL: CPT

## 2024-12-04 PROCEDURE — 80053 COMPREHEN METABOLIC PANEL: CPT

## 2024-12-04 PROCEDURE — 99214 OFFICE O/P EST MOD 30 MIN: CPT | Performed by: INTERNAL MEDICINE

## 2024-12-04 PROCEDURE — 83036 HEMOGLOBIN GLYCOSYLATED A1C: CPT

## 2024-12-04 PROCEDURE — 82043 UR ALBUMIN QUANTITATIVE: CPT

## 2024-12-04 PROCEDURE — 3080F DIAST BP >= 90 MM HG: CPT | Performed by: INTERNAL MEDICINE

## 2024-12-04 PROCEDURE — 86140 C-REACTIVE PROTEIN: CPT

## 2024-12-04 PROCEDURE — 82570 ASSAY OF URINE CREATININE: CPT

## 2024-12-04 PROCEDURE — 72128 CT CHEST SPINE W/O DYE: CPT

## 2024-12-04 PROCEDURE — 74176 CT ABD & PELVIS W/O CONTRAST: CPT | Performed by: STUDENT IN AN ORGANIZED HEALTH CARE EDUCATION/TRAINING PROGRAM

## 2024-12-04 PROCEDURE — 3048F LDL-C <100 MG/DL: CPT | Performed by: INTERNAL MEDICINE

## 2024-12-04 PROCEDURE — 72125 CT NECK SPINE W/O DYE: CPT | Performed by: RADIOLOGY

## 2024-12-04 PROCEDURE — 82962 GLUCOSE BLOOD TEST: CPT | Performed by: INTERNAL MEDICINE

## 2024-12-04 PROCEDURE — 81003 URINALYSIS AUTO W/O SCOPE: CPT

## 2024-12-04 PROCEDURE — 36415 COLL VENOUS BLD VENIPUNCTURE: CPT

## 2024-12-04 PROCEDURE — 74176 CT ABD & PELVIS W/O CONTRAST: CPT

## 2024-12-04 PROCEDURE — 3008F BODY MASS INDEX DOCD: CPT | Performed by: INTERNAL MEDICINE

## 2024-12-04 PROCEDURE — 85027 COMPLETE CBC AUTOMATED: CPT

## 2024-12-04 PROCEDURE — 72125 CT NECK SPINE W/O DYE: CPT

## 2024-12-04 PROCEDURE — 3051F HG A1C>EQUAL 7.0%<8.0%: CPT | Performed by: INTERNAL MEDICINE

## 2024-12-04 PROCEDURE — 3061F NEG MICROALBUMINURIA REV: CPT | Performed by: INTERNAL MEDICINE

## 2024-12-04 PROCEDURE — 72128 CT CHEST SPINE W/O DYE: CPT | Performed by: RADIOLOGY

## 2024-12-04 PROCEDURE — 3077F SYST BP >= 140 MM HG: CPT | Performed by: INTERNAL MEDICINE

## 2024-12-04 PROCEDURE — 4010F ACE/ARB THERAPY RXD/TAKEN: CPT | Performed by: INTERNAL MEDICINE

## 2024-12-04 RX ORDER — INSULIN HUMAN 500 [IU]/ML
INJECTION, SOLUTION SUBCUTANEOUS
Qty: 4 EACH | Refills: 5 | Status: SHIPPED | OUTPATIENT
Start: 2024-12-04

## 2024-12-04 RX ORDER — SEMAGLUTIDE 2.68 MG/ML
2 INJECTION, SOLUTION SUBCUTANEOUS
Qty: 3 ML | Refills: 5 | Status: SHIPPED | OUTPATIENT
Start: 2024-12-04 | End: 2025-06-02

## 2024-12-04 ASSESSMENT — ENCOUNTER SYMPTOMS
SLEEP DISTURBANCE: 1
NAUSEA: 1
DIFFICULTY URINATING: 0
FLANK PAIN: 0
FATIGUE: 1
SHORTNESS OF BREATH: 1
BACK PAIN: 1
CONSTIPATION: 0

## 2024-12-05 ENCOUNTER — TELEPHONE (OUTPATIENT)
Dept: PRIMARY CARE | Facility: CLINIC | Age: 39
End: 2024-12-05
Payer: COMMERCIAL

## 2024-12-05 LAB
EST. AVERAGE GLUCOSE BLD GHB EST-MCNC: 154 MG/DL
HBA1C MFR BLD: 7 %

## 2024-12-05 NOTE — RESULT ENCOUNTER NOTE
CT scan of the cervical spine showed No significant spinal canal or neural foraminal stenosis.  CT scan of the thoracic spine was unremarkable.

## 2024-12-05 NOTE — TELEPHONE ENCOUNTER
----- Message from Staci Bacon sent at 12/5/2024  9:59 AM EST -----  CT scan of the cervical spine showed No significant spinal canal or neural foraminal stenosis.  CT scan of the thoracic spine was unremarkable.

## 2024-12-08 NOTE — RESULT ENCOUNTER NOTE
Labs have been reviewed.  The A1C was found to be 7%, which is at goal.  The kidney and liver functions are normal.  The Triglyceride value is the lowest that it has ever been but is still elevated above goal.  Please continue the use of Fenofibrate and Atorvastatin. There is no spillage of protein in the urine.  The white count and platelets are elevated. However, the inflammatory markers are negative.  There was no evidence of UTI.

## 2024-12-08 NOTE — ASSESSMENT & PLAN NOTE
To continue Humulin R U500 105 units SQ before breakfast  To continue Ozempic 2mg SQ weekly   To obtain fasting blood and urine tests

## 2024-12-08 NOTE — ASSESSMENT & PLAN NOTE
To obtain UA, CBC, ESR and CRP   CT scans obtained today as ordered by PCP     For follow up in 6 months

## 2024-12-09 ENCOUNTER — TELEPHONE (OUTPATIENT)
Dept: PRIMARY CARE | Facility: CLINIC | Age: 39
End: 2024-12-09
Payer: COMMERCIAL

## 2024-12-09 DIAGNOSIS — D72.829 LEUKOCYTOSIS, UNSPECIFIED TYPE: Primary | ICD-10-CM

## 2024-12-09 RX ORDER — DOXYCYCLINE 100 MG/1
100 CAPSULE ORAL 2 TIMES DAILY
Qty: 14 CAPSULE | Refills: 0 | Status: SHIPPED | OUTPATIENT
Start: 2024-12-09 | End: 2024-12-09

## 2024-12-09 RX ORDER — DOXYCYCLINE 100 MG/1
100 CAPSULE ORAL 2 TIMES DAILY
Qty: 20 CAPSULE | Refills: 0 | Status: SHIPPED | OUTPATIENT
Start: 2024-12-09 | End: 2024-12-19

## 2024-12-09 NOTE — TELEPHONE ENCOUNTER
----- Message from Staci Bacon sent at 12/9/2024 10:18 AM EST -----  CT of your abdomen is normal, . I do not see anything that would be causing this pain. Have you talked to  to see if maybe this pain could be from the semaglutide? Abdominal pain is a common side effect of this medication.  Are you have normal bowel movements?  CT showed no obstructions of stones or inflammation, so I do not believe it has anything to do with your kidneys.

## 2024-12-11 DIAGNOSIS — R10.9 FLANK PAIN: Primary | ICD-10-CM

## 2024-12-14 ENCOUNTER — HOSPITAL ENCOUNTER (OUTPATIENT)
Dept: RADIOLOGY | Facility: HOSPITAL | Age: 39
Discharge: HOME | End: 2024-12-14
Payer: COMMERCIAL

## 2024-12-14 DIAGNOSIS — R10.9 FLANK PAIN: ICD-10-CM

## 2024-12-14 PROCEDURE — 76770 US EXAM ABDO BACK WALL COMP: CPT

## 2024-12-14 PROCEDURE — 76770 US EXAM ABDO BACK WALL COMP: CPT | Performed by: STUDENT IN AN ORGANIZED HEALTH CARE EDUCATION/TRAINING PROGRAM

## 2024-12-16 ENCOUNTER — TELEPHONE (OUTPATIENT)
Dept: PRIMARY CARE | Facility: CLINIC | Age: 39
End: 2024-12-16
Payer: COMMERCIAL

## 2024-12-16 DIAGNOSIS — R10.12 LEFT UPPER QUADRANT ABDOMINAL PAIN: Primary | ICD-10-CM

## 2024-12-16 DIAGNOSIS — F41.9 ANXIETY: ICD-10-CM

## 2024-12-16 RX ORDER — PANTOPRAZOLE SODIUM 20 MG/1
20 TABLET, DELAYED RELEASE ORAL
Qty: 30 TABLET | Refills: 2 | Status: SHIPPED | OUTPATIENT
Start: 2024-12-16 | End: 2025-03-16

## 2024-12-16 RX ORDER — DULOXETIN HYDROCHLORIDE 60 MG/1
CAPSULE, DELAYED RELEASE ORAL
Qty: 7 CAPSULE | Refills: 0 | Status: SHIPPED | OUTPATIENT
Start: 2024-12-16

## 2024-12-16 NOTE — TELEPHONE ENCOUNTER
----- Message from Staci Bacon sent at 12/16/2024  7:07 AM EST -----  The ultrasound of the kidneys were normal. Taking the antibiotic, has the pain got any better?

## 2024-12-17 ENCOUNTER — TELEPHONE (OUTPATIENT)
Dept: PRIMARY CARE | Facility: CLINIC | Age: 39
End: 2024-12-17
Payer: COMMERCIAL

## 2024-12-17 DIAGNOSIS — I10 BENIGN ESSENTIAL HYPERTENSION: ICD-10-CM

## 2024-12-17 DIAGNOSIS — M62.838 MUSCLE SPASM: Primary | ICD-10-CM

## 2024-12-17 DIAGNOSIS — E78.5 HYPERLIPIDEMIA, UNSPECIFIED HYPERLIPIDEMIA TYPE: ICD-10-CM

## 2024-12-17 RX ORDER — ATORVASTATIN CALCIUM 40 MG/1
TABLET, FILM COATED ORAL
Qty: 90 TABLET | Refills: 3 | Status: SHIPPED | OUTPATIENT
Start: 2024-12-17

## 2024-12-17 RX ORDER — TIZANIDINE 2 MG/1
2 TABLET ORAL EVERY 6 HOURS PRN
Qty: 30 TABLET | Refills: 2 | Status: SHIPPED | OUTPATIENT
Start: 2024-12-17 | End: 2025-12-17

## 2024-12-17 RX ORDER — AMLODIPINE BESYLATE 5 MG/1
5 TABLET ORAL DAILY
Qty: 90 TABLET | Refills: 3 | Status: SHIPPED | OUTPATIENT
Start: 2024-12-17 | End: 2025-12-17

## 2024-12-17 RX ORDER — PROPRANOLOL HYDROCHLORIDE 160 MG/1
CAPSULE, EXTENDED RELEASE ORAL
Qty: 30 CAPSULE | Refills: 11 | Status: SHIPPED | OUTPATIENT
Start: 2024-12-17

## 2024-12-17 NOTE — TELEPHONE ENCOUNTER
Medication: propranolol     Dosage: 160 mg 24 hr tablet     Sig:   TAKE ONE (1) CAPSULE BY MOUTH ONCE DAILY. **(INDERAL  MG CAP EQUIV)**     Dispense Quantity:    Refills:     Pharmacy: [facility] [place]     LOV:    NOV:

## 2024-12-22 DIAGNOSIS — D72.829 LEUKOCYTOSIS, UNSPECIFIED TYPE: Primary | ICD-10-CM

## 2025-01-23 DIAGNOSIS — F41.9 ANXIETY: ICD-10-CM

## 2025-01-23 DIAGNOSIS — I10 BENIGN ESSENTIAL HYPERTENSION: ICD-10-CM

## 2025-01-23 NOTE — TELEPHONE ENCOUNTER
Patient called in requesting med refill wanting them both filled from the [facility].  Can she get a few of the Duloxetine from [facility] in [place] due to being completely out.    DULoxetine (Cymbalta) 60 mg DR capsule [[id number]]    Order Details  Dose, Route, Frequency: As Directed   Dispense Quantity: 7 capsule Refills: 0    Note to Pharmacy: Requesting 1 year supply         Sig: TAKE 2 CAPSULES BY MOUTH ONCE  DAILY         Start Date: 12/16/24 End Date: --   Written Date: 12/16/24 Rx Expiration Date: 12/16/25    propranolol LA (Inderal LA) 160 mg 24 hr capsule [[id number]]    Order Details  Dose, Route, Frequency: As Directed   Dispense Quantity: 30 capsule Refills: 11          Sig: TAKE ONE (1) CAPSULE BY MOUTH ONCE DAILY. **(INDERAL  MG CAP EQUIV)**         Start Date: 12/17/24 End Date: --   Written Date: 12/17/24 Rx Expiration Date: 12/17/25        Pharmacy    [facility] - [address]     Sched states she wasn't told to sched.

## 2025-01-24 ENCOUNTER — TELEPHONE (OUTPATIENT)
Dept: PRIMARY CARE | Facility: CLINIC | Age: 40
End: 2025-01-24
Payer: COMMERCIAL

## 2025-01-24 DIAGNOSIS — F41.9 ANXIETY: ICD-10-CM

## 2025-01-24 DIAGNOSIS — I10 BENIGN ESSENTIAL HYPERTENSION: Primary | ICD-10-CM

## 2025-01-24 RX ORDER — DULOXETIN HYDROCHLORIDE 60 MG/1
CAPSULE, DELAYED RELEASE ORAL
Qty: 7 CAPSULE | Refills: 0 | Status: SHIPPED | OUTPATIENT
Start: 2025-01-24

## 2025-01-24 RX ORDER — PROPRANOLOL HYDROCHLORIDE 160 MG/1
CAPSULE, EXTENDED RELEASE ORAL
Qty: 30 CAPSULE | Refills: 11 | Status: SHIPPED | OUTPATIENT
Start: 2025-01-24 | End: 2025-01-24 | Stop reason: SDUPTHER

## 2025-01-24 RX ORDER — PROPRANOLOL HYDROCHLORIDE 160 MG/1
CAPSULE, EXTENDED RELEASE ORAL
Qty: 10 CAPSULE | Refills: 0 | Status: SHIPPED | OUTPATIENT
Start: 2025-01-24

## 2025-01-25 NOTE — PROGRESS NOTES
Patient calling emergency line needs short term refill for propranolol. [facility] and she is now out.

## 2025-01-27 ENCOUNTER — TELEPHONE (OUTPATIENT)
Dept: PRIMARY CARE | Facility: CLINIC | Age: 40
End: 2025-01-27
Payer: COMMERCIAL

## 2025-01-27 DIAGNOSIS — E11.9 TYPE 2 DIABETES MELLITUS WITHOUT COMPLICATION, WITH LONG-TERM CURRENT USE OF INSULIN (MULTI): ICD-10-CM

## 2025-01-27 DIAGNOSIS — F41.9 ANXIETY: ICD-10-CM

## 2025-01-27 DIAGNOSIS — I10 BENIGN ESSENTIAL HYPERTENSION: ICD-10-CM

## 2025-01-27 DIAGNOSIS — Z79.4 TYPE 2 DIABETES MELLITUS WITHOUT COMPLICATION, WITH LONG-TERM CURRENT USE OF INSULIN (MULTI): ICD-10-CM

## 2025-01-27 RX ORDER — PROPRANOLOL HYDROCHLORIDE 160 MG/1
CAPSULE, EXTENDED RELEASE ORAL
Qty: 90 CAPSULE | Refills: 3 | Status: SHIPPED | OUTPATIENT
Start: 2025-01-27

## 2025-01-27 RX ORDER — DULOXETIN HYDROCHLORIDE 60 MG/1
CAPSULE, DELAYED RELEASE ORAL
Qty: 180 CAPSULE | Refills: 3 | Status: SHIPPED | OUTPATIENT
Start: 2025-01-27

## 2025-01-27 NOTE — TELEPHONE ENCOUNTER
Said she requested these meds 2 weeks ago (I do not see any message for them)    She needs 90 day supply  # 180 of Duloxetine 60 mg  2 capsules daily  & Propranolol 160 mg # 90 1 capsule daily    To [facility]

## 2025-02-06 DIAGNOSIS — F41.9 ANXIETY: ICD-10-CM

## 2025-02-06 RX ORDER — DULOXETIN HYDROCHLORIDE 60 MG/1
CAPSULE, DELAYED RELEASE ORAL
Qty: 7 CAPSULE | Refills: 0 | Status: SHIPPED | OUTPATIENT
Start: 2025-02-06

## 2025-03-10 DIAGNOSIS — R10.9 LEFT FLANK PAIN: Primary | ICD-10-CM

## 2025-03-12 ENCOUNTER — TELEPHONE (OUTPATIENT)
Dept: PRIMARY CARE | Facility: CLINIC | Age: 40
End: 2025-03-12
Payer: COMMERCIAL

## 2025-03-12 NOTE — TELEPHONE ENCOUNTER
Patient called in stating that she has been sending messages to [facility] since 3/7 and has no answer from here.  Please advise and call patient

## 2025-03-13 RX ORDER — HYDROCODONE BITARTRATE AND ACETAMINOPHEN 5; 325 MG/1; MG/1
1 TABLET ORAL EVERY 6 HOURS PRN
Qty: 12 TABLET | Refills: 0 | Status: SHIPPED | OUTPATIENT
Start: 2025-03-13 | End: 2025-03-16

## 2025-03-13 RX ORDER — TAMSULOSIN HYDROCHLORIDE 0.4 MG/1
0.4 CAPSULE ORAL DAILY
Qty: 14 CAPSULE | Refills: 0 | Status: SHIPPED | OUTPATIENT
Start: 2025-03-13 | End: 2025-03-27

## 2025-03-13 NOTE — PROGRESS NOTES
Patient calling in reporting severe left flank pain, requesting medication for kidney stones. Patient has been in office with similar concerns in the past. Ultrasound and CT completed, showing small amount 0.3mm stones non-obstructing, no abnormalities. Patient endorses did make appointment with urology for this month.

## 2025-03-15 ENCOUNTER — APPOINTMENT (OUTPATIENT)
Dept: RADIOLOGY | Facility: HOSPITAL | Age: 40
End: 2025-03-15
Payer: COMMERCIAL

## 2025-03-15 DIAGNOSIS — Z12.31 SCREENING MAMMOGRAM FOR BREAST CANCER: ICD-10-CM

## 2025-03-19 ENCOUNTER — APPOINTMENT (OUTPATIENT)
Dept: UROLOGY | Facility: CLINIC | Age: 40
End: 2025-03-19
Payer: COMMERCIAL

## 2025-03-19 ENCOUNTER — APPOINTMENT (OUTPATIENT)
Dept: RADIOLOGY | Facility: HOSPITAL | Age: 40
End: 2025-03-19
Payer: COMMERCIAL

## 2025-03-19 DIAGNOSIS — R39.16 URINARY STRAINING: Primary | ICD-10-CM

## 2025-03-19 PROCEDURE — 4010F ACE/ARB THERAPY RXD/TAKEN: CPT | Performed by: PHYSICIAN ASSISTANT

## 2025-03-19 PROCEDURE — 99203 OFFICE O/P NEW LOW 30 MIN: CPT | Performed by: PHYSICIAN ASSISTANT

## 2025-03-19 ASSESSMENT — ENCOUNTER SYMPTOMS
DIFFICULTY URINATING: 1
ENDOCRINE NEGATIVE: 1
NEUROLOGICAL NEGATIVE: 1
ALLERGIC/IMMUNOLOGIC NEGATIVE: 1
BACK PAIN: 1
HEMATOLOGIC/LYMPHATIC NEGATIVE: 1
PSYCHIATRIC NEGATIVE: 1
CONSTITUTIONAL NEGATIVE: 1
GASTROINTESTINAL NEGATIVE: 1
RESPIRATORY NEGATIVE: 1
EYES NEGATIVE: 1
CARDIOVASCULAR NEGATIVE: 1

## 2025-03-19 NOTE — PROGRESS NOTES
Subjective   Patient ID: Denise Kurtz is a 39 y.o. female who presents for No chief complaint on file..  HPI  Patient is a 40 yo female kindly referred by Staci Bacon CNP for evaluation of urinary difficulty virtually.    Patient reports having a UTI in December ( no urine culture in system UA in 12/4/24 normal) she has been having low to mid left side pain radiating to her back, difficulty voiding needing to straining , feeling of incomplete voiding. She reports one episote of urgency incontinence a couple of days ago. She denies frequency dysuria or hematuria.     Patient reports the back pain is constant, it is worse when she has a full bladder. She feels better after she voids. She reports the pain is also exacerbated by movement.     She had a CT scan in the past which shows non obstructing kidney stones. No hydronephrosis or distended bladder.     She had repeated urine cultures which she reports were negative, but could not see it in the system.   === 12/04/24 ===    CT ABDOMEN PELVIS WO IV CONTRAST    - Impression -  When compared to the prior examination dated 02/07/2024, there has  been interval resolution of the prior punctate calculus in the left  proximal ureter. Stable size and position of the bilateral tiny renal  calculi without new hydronephrosis.    I personally reviewed the image(s) / study and I agree with the  findings as stated by Harvey Grijalva MD. This study was interpreted at  [facility], [place], [place].    MACRO:  None.    Signed by: Marco Rich 12/6/2024 9:23 PM  Dictation workstation:   [id number]      Urinalysis with Reflex Microscopic  Order: [id number]   Status: Final result       Visible to patient: Yes (seen)       Dx: Left flank pain    1 Result Note       1 Patient Communication          Component  Ref Range & Units 3 mo ago  (12/4/24) 1 yr ago  (1/6/24) 1 yr ago  (5/22/23) 3 yr ago  (8/26/21) 5 yr ago  (10/9/19)   Color, Urine  Light-Yellow, Yellow,  Dark-Yellow Light-Yellow Yellow R Yellow R Yellow R Yellow R   Appearance, Urine  Clear Clear Clear Clear R Clear R Hazy R   Specific Gravity, Urine  1.005 - 1.035 1.009 1.019 <1.005 Abnormal  1.020 1.014   pH, Urine  5.0, 5.5, 6.0, 6.5, 7.0, 7.5, 8.0 5.5 5.0 5.0 R 6.0 R 8.0 R   Protein, Urine  NEGATIVE, 10 (TRACE), 20 (TRACE) mg/dL NEGATIVE 30 (1+) Normal (N) R Negative R Negative R Negative R   Glucose, Urine  Normal mg/dL Normal NEGATIVE R Negative R >=500(3+) Abnormal  R 50 (TRACE) mg/dl Abnormal  R   Blood, Urine  NEGATIVE NEGATIVE NEGATIVE Negative Negative Negative   Ketones, Urine  NEGATIVE mg/dL NEGATIVE NEGATIVE Negative Negative Negative   Bilirubin, Urine  NEGATIVE NEGATIVE SMALL (1+) Abnormal  Negative Negative Negative   Urobilinogen, Urine  Normal mg/dL Normal <2.0 R <2.0 R <2.0 R <2.0 R   Nitrite, Urine  NEGATIVE NEGATIVE NEGATIVE Negative Negative Negative   Leukocyte Esterase, Urine  NEGATIVE NEGATIVE NEGATIVE Negative Negative Negative   Resulting Agency [facility]             Specimen Collected: 12/04/24 15:40 Last Resulted: 12/04/24 20:49        Lab Flowsheet        Order Details        View Encounter        Lab and Collection Details        Routing        Result History     View All Conversations on this Encounter         Review of Systems   Constitutional: Negative.    HENT: Negative.     Eyes: Negative.    Respiratory: Negative.     Cardiovascular: Negative.    Gastrointestinal: Negative.    Endocrine: Negative.    Genitourinary:  Positive for difficulty urinating.   Musculoskeletal:  Positive for back pain.   Skin: Negative.    Allergic/Immunologic: Negative.    Neurological: Negative.    Hematological: Negative.    Psychiatric/Behavioral: Negative.         Objective   Physical Exam  Virtual visit.   Assessment/Plan     Left low back pain  I discussed the back pain could be MSK in nature.   Because she feels the pain is  worse with a full bladder and has difficulty voiding I advised her to follow up with Dr Goldberg or Dr Luong for further evaluation.            Brittney Biggs PA-C 03/19/25 4:19 PM

## 2025-04-02 DIAGNOSIS — Z79.4 TYPE 2 DIABETES MELLITUS WITHOUT COMPLICATION, WITH LONG-TERM CURRENT USE OF INSULIN: ICD-10-CM

## 2025-04-02 DIAGNOSIS — E11.9 TYPE 2 DIABETES MELLITUS WITHOUT COMPLICATION, WITH LONG-TERM CURRENT USE OF INSULIN: ICD-10-CM

## 2025-04-03 RX ORDER — SEMAGLUTIDE 2.68 MG/ML
INJECTION, SOLUTION SUBCUTANEOUS
Qty: 9 ML | Refills: 3 | Status: SHIPPED | OUTPATIENT
Start: 2025-04-03

## 2025-04-11 ENCOUNTER — APPOINTMENT (OUTPATIENT)
Dept: PRIMARY CARE | Facility: CLINIC | Age: 40
End: 2025-04-11
Payer: COMMERCIAL

## 2025-04-11 VITALS
HEART RATE: 88 BPM | OXYGEN SATURATION: 96 % | DIASTOLIC BLOOD PRESSURE: 75 MMHG | SYSTOLIC BLOOD PRESSURE: 110 MMHG | WEIGHT: 234 LBS | HEIGHT: 68 IN | BODY MASS INDEX: 35.46 KG/M2

## 2025-04-11 DIAGNOSIS — Z79.4 LONG TERM (CURRENT) USE OF INSULIN (MULTI): ICD-10-CM

## 2025-04-11 DIAGNOSIS — E53.9 VITAMIN B DEFICIENCY: ICD-10-CM

## 2025-04-11 DIAGNOSIS — G89.29 CHRONIC LEFT FLANK PAIN: ICD-10-CM

## 2025-04-11 DIAGNOSIS — R39.82 CHRONIC URINARY BLADDER PAIN: ICD-10-CM

## 2025-04-11 DIAGNOSIS — E55.9 VITAMIN D DEFICIENCY: ICD-10-CM

## 2025-04-11 DIAGNOSIS — E78.2 MIXED HYPERLIPIDEMIA: ICD-10-CM

## 2025-04-11 DIAGNOSIS — R10.9 LEFT FLANK PAIN: ICD-10-CM

## 2025-04-11 DIAGNOSIS — R90.82 WHITE MATTER ABNORMALITY ON MRI OF BRAIN: Primary | ICD-10-CM

## 2025-04-11 DIAGNOSIS — G89.29 OTHER CHRONIC PAIN: ICD-10-CM

## 2025-04-11 DIAGNOSIS — R10.9 CHRONIC LEFT FLANK PAIN: ICD-10-CM

## 2025-04-11 DIAGNOSIS — D72.829 LEUKOCYTOSIS, UNSPECIFIED TYPE: ICD-10-CM

## 2025-04-11 DIAGNOSIS — G35 MULTIPLE SCLEROSIS (MULTI): ICD-10-CM

## 2025-04-11 DIAGNOSIS — R53.82 CHRONIC FATIGUE: ICD-10-CM

## 2025-04-11 DIAGNOSIS — I10 PRIMARY HYPERTENSION: ICD-10-CM

## 2025-04-11 DIAGNOSIS — Z79.4 LONG-TERM INSULIN USE (MULTI): ICD-10-CM

## 2025-04-11 DIAGNOSIS — E11.9 TYPE 2 DIABETES MELLITUS WITHOUT COMPLICATION, WITHOUT LONG-TERM CURRENT USE OF INSULIN: ICD-10-CM

## 2025-04-11 DIAGNOSIS — E11.65 UNCONTROLLED TYPE 2 DIABETES MELLITUS WITH HYPERGLYCEMIA: ICD-10-CM

## 2025-04-11 DIAGNOSIS — F33.9 RECURRENT MAJOR DEPRESSIVE EPISODES (CMS-HCC): ICD-10-CM

## 2025-04-11 DIAGNOSIS — K64.9 HEMORRHOIDS, UNSPECIFIED HEMORRHOID TYPE: ICD-10-CM

## 2025-04-11 PROBLEM — J18.9 HAP (HOSPITAL-ACQUIRED PNEUMONIA): Status: RESOLVED | Noted: 2023-11-17 | Resolved: 2025-04-11

## 2025-04-11 PROBLEM — R00.0 TACHYCARDIA: Status: RESOLVED | Noted: 2018-11-16 | Resolved: 2025-04-11

## 2025-04-11 PROBLEM — R70.0 ELEVATED SED RATE: Status: RESOLVED | Noted: 2023-11-17 | Resolved: 2025-04-11

## 2025-04-11 PROBLEM — R10.2 SUPRAPUBIC DISCOMFORT: Status: RESOLVED | Noted: 2023-11-17 | Resolved: 2025-04-11
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
## 2025-04-11 PROBLEM — U07.1 COVID-19 VIRUS INFECTION: Status: RESOLVED | Noted: 2023-11-17 | Resolved: 2025-04-11

## 2025-04-11 PROBLEM — R25.2 BILATERAL LEG CRAMPS: Status: RESOLVED | Noted: 2023-11-17 | Resolved: 2025-04-11

## 2025-04-11 PROBLEM — J02.9 SORE THROAT: Status: RESOLVED | Noted: 2023-11-17 | Resolved: 2025-04-11

## 2025-04-11 PROBLEM — R60.0 PERIPHERAL EDEMA: Status: RESOLVED | Noted: 2023-11-17 | Resolved: 2025-04-11

## 2025-04-11 PROBLEM — R07.89 ATYPICAL CHEST PAIN: Status: RESOLVED | Noted: 2023-11-17 | Resolved: 2025-04-11

## 2025-04-11 PROBLEM — R06.00 DYSPNEA: Status: RESOLVED | Noted: 2023-11-17 | Resolved: 2025-04-11

## 2025-04-11 PROBLEM — L91.8 CUTANEOUS SKIN TAGS: Status: RESOLVED | Noted: 2023-11-17 | Resolved: 2025-04-11

## 2025-04-11 PROBLEM — I83.90 VARICOSITIES OF LEG: Status: RESOLVED | Noted: 2023-11-17 | Resolved: 2025-04-11

## 2025-04-11 PROBLEM — R25.2 MUSCLE CRAMPS: Status: RESOLVED | Noted: 2023-11-17 | Resolved: 2025-04-11

## 2025-04-11 PROBLEM — N39.0 ACUTE UTI: Status: RESOLVED | Noted: 2023-11-17 | Resolved: 2025-04-11

## 2025-04-11 PROBLEM — Z20.822 EXPOSURE TO COVID-19 VIRUS: Status: RESOLVED | Noted: 2023-11-17 | Resolved: 2025-04-11

## 2025-04-11 PROBLEM — R31.9 HEMATURIA: Status: RESOLVED | Noted: 2023-11-17 | Resolved: 2025-04-11

## 2025-04-11 PROBLEM — R05.9 COUGH: Status: RESOLVED | Noted: 2023-11-17 | Resolved: 2025-04-11

## 2025-04-11 RX ORDER — HYDROCODONE BITARTRATE AND ACETAMINOPHEN 5; 325 MG/1; MG/1
1 TABLET ORAL EVERY 6 HOURS PRN
Qty: 20 TABLET | Refills: 0 | Status: SHIPPED | OUTPATIENT
Start: 2025-04-11 | End: 2025-04-18

## 2025-04-11 RX ORDER — CYANOCOBALAMIN 1000 UG/ML
1000 INJECTION, SOLUTION INTRAMUSCULAR; SUBCUTANEOUS ONCE
Status: COMPLETED | OUTPATIENT
Start: 2025-04-11 | End: 2025-04-11

## 2025-04-11 RX ORDER — HYDROCORTISONE 25 MG/G
CREAM TOPICAL 4 TIMES DAILY PRN
Qty: 30 G | Refills: 0 | Status: SHIPPED | OUTPATIENT
Start: 2025-04-11 | End: 2026-04-11

## 2025-04-11 RX ADMIN — CYANOCOBALAMIN 1000 MCG: 1000 INJECTION, SOLUTION INTRAMUSCULAR; SUBCUTANEOUS at 07:20

## 2025-04-11 ASSESSMENT — ENCOUNTER SYMPTOMS
ENDOCRINE NEGATIVE: 1
ALLERGIC/IMMUNOLOGIC NEGATIVE: 1
NEUROLOGICAL NEGATIVE: 1
OCCASIONAL FEELINGS OF UNSTEADINESS: 0
DYSPHORIC MOOD: 1
GASTROINTESTINAL NEGATIVE: 1
MUSCULOSKELETAL NEGATIVE: 1
ROS GI COMMENTS: SEE HPI
HEMATOLOGIC/LYMPHATIC NEGATIVE: 1
RESPIRATORY NEGATIVE: 1
EYES NEGATIVE: 1
LOSS OF SENSATION IN FEET: 0
FATIGUE: 1
DEPRESSION: 0
CARDIOVASCULAR NEGATIVE: 1
DIFFICULTY URINATING: 1

## 2025-04-11 ASSESSMENT — PATIENT HEALTH QUESTIONNAIRE - PHQ9
10. IF YOU CHECKED OFF ANY PROBLEMS, HOW DIFFICULT HAVE THESE PROBLEMS MADE IT FOR YOU TO DO YOUR WORK, TAKE CARE OF THINGS AT HOME, OR GET ALONG WITH OTHER PEOPLE: NOT DIFFICULT AT ALL
2. FEELING DOWN, DEPRESSED OR HOPELESS: SEVERAL DAYS
1. LITTLE INTEREST OR PLEASURE IN DOING THINGS: SEVERAL DAYS
SUM OF ALL RESPONSES TO PHQ9 QUESTIONS 1 AND 2: 2

## 2025-04-11 NOTE — ASSESSMENT & PLAN NOTE
Assessed and stable on losartan and lisinopril.  BP in office 110/75.  Denies any heart related chest pain, chest tightness, lightheadedness dizziness

## 2025-04-11 NOTE — PROGRESS NOTES
Subjective   Patient ID: Denise Kurtz is a 39 y.o. female who presents for Back Pain (Patient here today to discuss left side back pain and that crosses to the front, ongoing since November. Right sided pain above breast as well that can travel to her back. Issues with hemorrhoids. ).    Past Medical, Surgical, and Family History reviewed and updated in chart.     Reviewed all medications by prescribing practitioner or clinical pharmacist (such as prescriptions, OTCs, herbal therapies and supplements) and documented in the medical record.    HPI   39 yof in office for follow up.     Last visit patient was in with similar complaints.   Tired all the time, pain all the time. Fatigue. Is now having body aches and cramping to upper right arm. Heating pad to right upper arm  Endorses the pain medication and the muscles relaxer's help  Seen urology, in note stated feels like it is MSK but noted for patient to follow with  due to symptoms of f bladder is full left side flank pain, when urinates the pain is relieved some. Noticed two weeks ago when needed to have a bowel movement the pain was excruciating, Denies burning with urination, frequency. Is having frequency.   Pain to left side has been chronic since last November.   Hematology at [facility] referral sent from  for leukocytosis appointment has not been made, patient states was unaware and no one called her.  office contacted me yesterday to ask patient to get scheduled. Will schedule today.  Ultrasound to kidneys completed that was unremarkable,   CT to abdomen done that was unremarkable.   Unsure the etiology of pain.    MRI done in 2022 showed Single small focus of nonspecific T2 hyperintensity located within  the right parietal lobe white matter.   Did see neurology after, she did state they mentions possible MS but states later ruled out and she had thoracic outlet surgery for the pain she was having, Endorses that specific pain went away  "but continue to have body aches, chronic fatigue and unable to do daily living due to this.    Has never been tested per patient and from what I can see in chart for autoimmune, will order autoimmune testing today.     Review of Systems   Constitutional:  Positive for fatigue.   HENT: Negative.     Eyes: Negative.    Respiratory: Negative.     Cardiovascular: Negative.    Gastrointestinal: Negative.         See HPI   Endocrine: Negative.    Genitourinary:  Positive for difficulty urinating and urgency.        See HPI   Musculoskeletal: Negative.         See HPI   Skin: Negative.    Allergic/Immunologic: Negative.    Neurological: Negative.    Hematological: Negative.    Psychiatric/Behavioral:  Positive for dysphoric mood.    All other systems reviewed and are negative.      Objective   /75   Pulse 88   Ht 1.727 m (5' 7.99\")   Wt 106 kg (234 lb)   SpO2 96%   BMI 35.59 kg/m²     Physical Exam  Constitutional:       Appearance: Normal appearance.   HENT:      Head: Normocephalic and atraumatic.      Right Ear: Tympanic membrane normal.      Left Ear: Tympanic membrane normal.      Nose: Nose normal.      Mouth/Throat:      Mouth: Mucous membranes are moist.      Pharynx: Oropharynx is clear.   Eyes:      Pupils: Pupils are equal, round, and reactive to light.   Cardiovascular:      Pulses: Normal pulses.      Heart sounds: Normal heart sounds.   Pulmonary:      Effort: Pulmonary effort is normal.      Breath sounds: Normal breath sounds.   Abdominal:      General: Bowel sounds are normal.      Palpations: Abdomen is soft.      Tenderness: There is abdominal tenderness.   Musculoskeletal:         General: Normal range of motion.      Cervical back: Normal range of motion.   Skin:     General: Skin is warm.   Neurological:      Mental Status: She is alert and oriented to person, place, and time.         Assessment/Plan   Problem List Items Addressed This Visit       Fatigue    Relevant Orders    TSH with " reflex to Free T4 if abnormal    Rheumatoid Factor    Sedimentation Rate    C-Reactive Protein    Uric Acid    SHWETA with Reflex to FLAKITO    Hypertension     Assessed and stable on losartan and lisinopril.  BP in office 110/75.  Denies any heart related chest pain, chest tightness, lightheadedness dizziness         Relevant Orders    CBC and Auto Differential    Comprehensive Metabolic Panel    Hyperlipidemia     Assessed, last LDL was 64.  Is on atorvastatin 40 mg daily.  Repeat lipid panel ordered today.         Relevant Orders    Lipid Panel    Left flank pain    Relevant Medications    HYDROcodone-acetaminophen (Norco) 5-325 mg tablet    Multiple sclerosis (Multi)     States this was ruled out, had the thoracic surgery and was ruled by neurology that it was not MS but thoracic surgery outlet.         Relevant Orders    MR brain w and wo IV contrast    Recurrent major depressive episodes (CMS-HCC)     Assessed and stable.  On duloxetine 60 mg daily PHQ-9 score of 0.         Type 2 diabetes mellitus    Relevant Orders    Hemoglobin A1C    Uncontrolled type 2 diabetes mellitus with hyperglycemia    Vitamin D deficiency    Relevant Orders    Vitamin D 25-Hydroxy,Total (for eval of Vitamin D levels)    Long-term insulin use (Multi)     Other Visit Diagnoses       White matter abnormality on MRI of brain    -  Primary    Relevant Orders    MR brain w and wo IV contrast    Long term (current) use of insulin (Multi)        Chronic left flank pain        Relevant Orders    Referral to Urogynecology    Follow Up In [facility] - PCP - Established    Chronic urinary bladder pain        Other chronic pain        Relevant Orders    TSH with reflex to Free T4 if abnormal    Rheumatoid Factor    Sedimentation Rate    C-Reactive Protein    Uric Acid    SHWETA with Reflex to FLAKITO    Leukocytosis, unspecified type        Relevant Orders    CBC and Auto Differential    Vitamin B deficiency        Relevant Medications     cyanocobalamin (Vitamin B-12) injection 1,000 mcg (Completed)    Other Relevant Orders    Vitamin B12    Hemorrhoids, unspecified hemorrhoid type        Relevant Medications    hydrocortisone (Anusol-HC) 2.5 % rectal cream

## 2025-04-11 NOTE — PATIENT INSTRUCTIONS
I am ordering labs for you to get when you are fasting (meaning nothing to eat or drink for at least 12 hours before, water and black coffee are okay). Once we get the results, someone from the office will call you. If you do not hear from someone within one week of having your blood drawn, please call us [phone] so that we can go over the results.

## 2025-04-11 NOTE — ASSESSMENT & PLAN NOTE
States this was ruled out, had the thoracic surgery and was ruled by neurology that it was not MS but thoracic surgery outlet.

## 2025-04-12 LAB
25(OH)D3+25(OH)D2 SERPL-MCNC: 20 NG/ML (ref 30–100)
ALBUMIN SERPL-MCNC: 4.4 G/DL (ref 3.6–5.1)
ALP SERPL-CCNC: 45 U/L (ref 31–125)
ALT SERPL-CCNC: 13 U/L (ref 6–29)
ANA SER QL IF: NORMAL
ANION GAP SERPL CALCULATED.4IONS-SCNC: 9 MMOL/L (CALC) (ref 7–17)
AST SERPL-CCNC: 15 U/L (ref 10–30)
BASOPHILS # BLD AUTO: 80 CELLS/UL (ref 0–200)
BASOPHILS NFR BLD AUTO: 0.8 %
BILIRUB SERPL-MCNC: 0.2 MG/DL (ref 0.2–1.2)
BUN SERPL-MCNC: 16 MG/DL (ref 7–25)
CALCIUM SERPL-MCNC: 9.5 MG/DL (ref 8.6–10.2)
CHLORIDE SERPL-SCNC: 106 MMOL/L (ref 98–110)
CHOLEST SERPL-MCNC: 153 MG/DL
CHOLEST/HDLC SERPL: 4.4 (CALC)
CO2 SERPL-SCNC: 26 MMOL/L (ref 20–32)
CREAT SERPL-MCNC: 0.76 MG/DL (ref 0.5–0.99)
CRP SERPL-MCNC: NORMAL MG/L
EGFRCR SERPLBLD CKD-EPI 2021: 101 ML/MIN/1.73M2
EOSINOPHIL # BLD AUTO: 170 CELLS/UL (ref 15–500)
EOSINOPHIL NFR BLD AUTO: 1.7 %
ERYTHROCYTE [DISTWIDTH] IN BLOOD BY AUTOMATED COUNT: 12.9 % (ref 11–15)
ERYTHROCYTE [SEDIMENTATION RATE] IN BLOOD BY WESTERGREN METHOD: 2 MM/H
EST. AVERAGE GLUCOSE BLD GHB EST-MCNC: 160 MG/DL
EST. AVERAGE GLUCOSE BLD GHB EST-SCNC: 8.9 MMOL/L
GLUCOSE SERPL-MCNC: 143 MG/DL (ref 65–99)
HBA1C MFR BLD: 7.2 % OF TOTAL HGB
HCT VFR BLD AUTO: 42.4 % (ref 35–45)
HDLC SERPL-MCNC: 35 MG/DL
HGB BLD-MCNC: 14.1 G/DL (ref 11.7–15.5)
LDLC SERPL CALC-MCNC: 82 MG/DL (CALC)
LYMPHOCYTES # BLD AUTO: 3120 CELLS/UL (ref 850–3900)
LYMPHOCYTES NFR BLD AUTO: 31.2 %
MCH RBC QN AUTO: 29.4 PG (ref 27–33)
MCHC RBC AUTO-ENTMCNC: 33.3 G/DL (ref 32–36)
MCV RBC AUTO: 88.5 FL (ref 80–100)
MONOCYTES # BLD AUTO: 670 CELLS/UL (ref 200–950)
MONOCYTES NFR BLD AUTO: 6.7 %
NEUTROPHILS # BLD AUTO: 5960 CELLS/UL (ref 1500–7800)
NEUTROPHILS NFR BLD AUTO: 59.6 %
NONHDLC SERPL-MCNC: 118 MG/DL (CALC)
PLATELET # BLD AUTO: 416 THOUSAND/UL (ref 140–400)
PMV BLD REES-ECKER: 9.1 FL (ref 7.5–12.5)
POTASSIUM SERPL-SCNC: 4.3 MMOL/L (ref 3.5–5.3)
PROT SERPL-MCNC: 6.2 G/DL (ref 6.1–8.1)
RBC # BLD AUTO: 4.79 MILLION/UL (ref 3.8–5.1)
RHEUMATOID FACT SERPL-ACNC: NORMAL [IU]/ML
SODIUM SERPL-SCNC: 141 MMOL/L (ref 135–146)
TRIGL SERPL-MCNC: 271 MG/DL
TSH SERPL-ACNC: 2.08 MIU/L
URATE SERPL-MCNC: 4.8 MG/DL (ref 2.5–7)
VIT B12 SERPL-MCNC: >2000 PG/ML (ref 200–1100)
WBC # BLD AUTO: 10 THOUSAND/UL (ref 3.8–10.8)

## 2025-04-14 LAB
25(OH)D3+25(OH)D2 SERPL-MCNC: 20 NG/ML (ref 30–100)
ALBUMIN SERPL-MCNC: 4.4 G/DL (ref 3.6–5.1)
ALP SERPL-CCNC: 45 U/L (ref 31–125)
ALT SERPL-CCNC: 13 U/L (ref 6–29)
ANA SER QL IF: NEGATIVE
ANION GAP SERPL CALCULATED.4IONS-SCNC: 9 MMOL/L (CALC) (ref 7–17)
AST SERPL-CCNC: 15 U/L (ref 10–30)
BASOPHILS # BLD AUTO: 80 CELLS/UL (ref 0–200)
BASOPHILS NFR BLD AUTO: 0.8 %
BILIRUB SERPL-MCNC: 0.2 MG/DL (ref 0.2–1.2)
BUN SERPL-MCNC: 16 MG/DL (ref 7–25)
CALCIUM SERPL-MCNC: 9.5 MG/DL (ref 8.6–10.2)
CHLORIDE SERPL-SCNC: 106 MMOL/L (ref 98–110)
CHOLEST SERPL-MCNC: 153 MG/DL
CHOLEST/HDLC SERPL: 4.4 (CALC)
CO2 SERPL-SCNC: 26 MMOL/L (ref 20–32)
CREAT SERPL-MCNC: 0.76 MG/DL (ref 0.5–0.99)
CRP SERPL-MCNC: <3 MG/L
EGFRCR SERPLBLD CKD-EPI 2021: 101 ML/MIN/1.73M2
EOSINOPHIL # BLD AUTO: 170 CELLS/UL (ref 15–500)
EOSINOPHIL NFR BLD AUTO: 1.7 %
ERYTHROCYTE [DISTWIDTH] IN BLOOD BY AUTOMATED COUNT: 12.9 % (ref 11–15)
ERYTHROCYTE [SEDIMENTATION RATE] IN BLOOD BY WESTERGREN METHOD: 2 MM/H
EST. AVERAGE GLUCOSE BLD GHB EST-MCNC: 160 MG/DL
EST. AVERAGE GLUCOSE BLD GHB EST-SCNC: 8.9 MMOL/L
GLUCOSE SERPL-MCNC: 143 MG/DL (ref 65–99)
HBA1C MFR BLD: 7.2 % OF TOTAL HGB
HCT VFR BLD AUTO: 42.4 % (ref 35–45)
HDLC SERPL-MCNC: 35 MG/DL
HGB BLD-MCNC: 14.1 G/DL (ref 11.7–15.5)
LDLC SERPL CALC-MCNC: 82 MG/DL (CALC)
LYMPHOCYTES # BLD AUTO: 3120 CELLS/UL (ref 850–3900)
LYMPHOCYTES NFR BLD AUTO: 31.2 %
MCH RBC QN AUTO: 29.4 PG (ref 27–33)
MCHC RBC AUTO-ENTMCNC: 33.3 G/DL (ref 32–36)
MCV RBC AUTO: 88.5 FL (ref 80–100)
MONOCYTES # BLD AUTO: 670 CELLS/UL (ref 200–950)
MONOCYTES NFR BLD AUTO: 6.7 %
NEUTROPHILS # BLD AUTO: 5960 CELLS/UL (ref 1500–7800)
NEUTROPHILS NFR BLD AUTO: 59.6 %
NONHDLC SERPL-MCNC: 118 MG/DL (CALC)
PLATELET # BLD AUTO: 416 THOUSAND/UL (ref 140–400)
PMV BLD REES-ECKER: 9.1 FL (ref 7.5–12.5)
POTASSIUM SERPL-SCNC: 4.3 MMOL/L (ref 3.5–5.3)
PROT SERPL-MCNC: 6.2 G/DL (ref 6.1–8.1)
RBC # BLD AUTO: 4.79 MILLION/UL (ref 3.8–5.1)
RHEUMATOID FACT SERPL-ACNC: <10 IU/ML
SODIUM SERPL-SCNC: 141 MMOL/L (ref 135–146)
TRIGL SERPL-MCNC: 271 MG/DL
TSH SERPL-ACNC: 2.08 MIU/L
URATE SERPL-MCNC: 4.8 MG/DL (ref 2.5–7)
VIT B12 SERPL-MCNC: >2000 PG/ML (ref 200–1100)
WBC # BLD AUTO: 10 THOUSAND/UL (ref 3.8–10.8)

## 2025-04-15 ENCOUNTER — TELEPHONE (OUTPATIENT)
Dept: PRIMARY CARE | Facility: CLINIC | Age: 40
End: 2025-04-15
Payer: COMMERCIAL

## 2025-04-15 ENCOUNTER — APPOINTMENT (OUTPATIENT)
Dept: RADIOLOGY | Facility: HOSPITAL | Age: 40
End: 2025-04-15
Payer: COMMERCIAL

## 2025-04-15 NOTE — TELEPHONE ENCOUNTER
----- Message from Staci Bacon sent at 4/15/2025  1:59 PM EDT -----  Vitamin B level is elevated, we will recheck could be falsely elevated since you had the injection right before lab work.   Vitamin D level is slightly low I recommend over the counter vitamin D 2000IU daily.   SHWETA for any autoimmune was negative suggesting no autoimmune disorders.  A1c elevated follows endocrinology.   Triglycerides are high, I would recommend decrease in sugars and carbs to help decrease this.

## 2025-04-16 ENCOUNTER — PATIENT MESSAGE (OUTPATIENT)
Dept: ENDOCRINOLOGY | Facility: CLINIC | Age: 40
End: 2025-04-16

## 2025-04-24 ENCOUNTER — APPOINTMENT (OUTPATIENT)
Dept: PRIMARY CARE | Facility: CLINIC | Age: 40
End: 2025-04-24
Payer: COMMERCIAL

## 2025-05-07 ENCOUNTER — APPOINTMENT (OUTPATIENT)
Dept: RADIOLOGY | Facility: HOSPITAL | Age: 40
End: 2025-05-07
Payer: COMMERCIAL

## 2025-05-07 ENCOUNTER — HOSPITAL ENCOUNTER (OUTPATIENT)
Dept: RADIOLOGY | Facility: HOSPITAL | Age: 40
Discharge: HOME | End: 2025-05-07
Payer: COMMERCIAL

## 2025-05-07 VITALS — BODY MASS INDEX: 36.73 KG/M2 | HEIGHT: 67 IN | WEIGHT: 234 LBS

## 2025-05-07 DIAGNOSIS — G35 MULTIPLE SCLEROSIS (MULTI): ICD-10-CM

## 2025-05-07 DIAGNOSIS — R90.82 WHITE MATTER ABNORMALITY ON MRI OF BRAIN: ICD-10-CM

## 2025-05-07 DIAGNOSIS — Z12.31 SCREENING MAMMOGRAM FOR BREAST CANCER: ICD-10-CM

## 2025-05-07 PROCEDURE — 2550000001 HC RX 255 CONTRASTS: Mod: JZ

## 2025-05-07 PROCEDURE — 70553 MRI BRAIN STEM W/O & W/DYE: CPT

## 2025-05-07 PROCEDURE — 77067 SCR MAMMO BI INCL CAD: CPT | Performed by: RADIOLOGY

## 2025-05-07 PROCEDURE — 77063 BREAST TOMOSYNTHESIS BI: CPT | Performed by: RADIOLOGY

## 2025-05-07 PROCEDURE — 77067 SCR MAMMO BI INCL CAD: CPT

## 2025-05-07 PROCEDURE — A9575 INJ GADOTERATE MEGLUMI 0.1ML: HCPCS | Mod: JZ

## 2025-05-07 RX ORDER — GADOTERATE MEGLUMINE 376.9 MG/ML
20 INJECTION INTRAVENOUS
Status: COMPLETED | OUTPATIENT
Start: 2025-05-07 | End: 2025-05-07

## 2025-05-07 RX ADMIN — GADOTERATE MEGLUMINE 20 ML: 376.9 INJECTION INTRAVENOUS at 18:17

## 2025-05-08 ENCOUNTER — APPOINTMENT (OUTPATIENT)
Dept: HEMATOLOGY/ONCOLOGY | Facility: CLINIC | Age: 40
End: 2025-05-08
Payer: COMMERCIAL

## 2025-05-12 ENCOUNTER — APPOINTMENT (OUTPATIENT)
Dept: RADIOLOGY | Facility: HOSPITAL | Age: 40
End: 2025-05-12
Payer: COMMERCIAL

## 2025-05-20 ENCOUNTER — TELEPHONE (OUTPATIENT)
Dept: PRIMARY CARE | Facility: CLINIC | Age: 40
End: 2025-05-20
Payer: COMMERCIAL

## 2025-05-20 NOTE — TELEPHONE ENCOUNTER
----- Message from Staci Bacon sent at 5/19/2025  7:13 PM EDT -----  MRI of the brain was unremarkable. Shows some Punctate nonspecific hyperintense foci within the right frontal and right parietal lobe white matter. This could be from old trauma, history of mar   blood pressure, diabetes or migraines.   ----- Message -----  From: Interface, Radiology Results In  Sent: 5/8/2025   9:18 AM EDT  To: YOSHI Virk-CNP

## 2025-05-22 ENCOUNTER — TELEPHONE (OUTPATIENT)
Dept: ENDOCRINOLOGY | Facility: CLINIC | Age: 40
End: 2025-05-22
Payer: COMMERCIAL

## 2025-05-22 NOTE — TELEPHONE ENCOUNTER
I left Denise a message about her Ozempic 0.25/0.5 mg dose to see if she is continuously staying at this dose or if she will titrate up to a different dose.

## 2025-05-22 NOTE — TELEPHONE ENCOUNTER
Denise called back and has an upcoming appointment on 6/4/25 and will talk to Dr. Aaron about possibly going back on Mounjaro. She just received a shipment of Ozempic in the mail.

## 2025-06-03 ENCOUNTER — APPOINTMENT (OUTPATIENT)
Dept: PRIMARY CARE | Facility: CLINIC | Age: 40
End: 2025-06-03
Payer: COMMERCIAL

## 2025-06-03 VITALS
OXYGEN SATURATION: 96 % | DIASTOLIC BLOOD PRESSURE: 75 MMHG | BODY MASS INDEX: 36.57 KG/M2 | HEIGHT: 67 IN | SYSTOLIC BLOOD PRESSURE: 110 MMHG | HEART RATE: 86 BPM | WEIGHT: 233 LBS

## 2025-06-03 DIAGNOSIS — R53.82 CHRONIC FATIGUE AND MALAISE: ICD-10-CM

## 2025-06-03 DIAGNOSIS — R53.81 CHRONIC FATIGUE AND MALAISE: ICD-10-CM

## 2025-06-03 DIAGNOSIS — Z00.00 HEALTH CARE MAINTENANCE: ICD-10-CM

## 2025-06-03 DIAGNOSIS — M79.18 MUSCULOSKELETAL PAIN: ICD-10-CM

## 2025-06-03 DIAGNOSIS — R10.9 LEFT FLANK PAIN: ICD-10-CM

## 2025-06-03 DIAGNOSIS — R11.0 NAUSEA: Primary | ICD-10-CM

## 2025-06-03 PROBLEM — G35 MULTIPLE SCLEROSIS (MULTI): Status: RESOLVED | Noted: 2023-11-17 | Resolved: 2025-06-03

## 2025-06-03 PROBLEM — S42.025A CLOSED NONDISPLACED FRACTURE OF SHAFT OF LEFT CLAVICLE: Status: RESOLVED | Noted: 2023-11-17 | Resolved: 2025-06-03

## 2025-06-03 PROCEDURE — 3074F SYST BP LT 130 MM HG: CPT

## 2025-06-03 PROCEDURE — 1036F TOBACCO NON-USER: CPT

## 2025-06-03 PROCEDURE — 99214 OFFICE O/P EST MOD 30 MIN: CPT

## 2025-06-03 PROCEDURE — 3008F BODY MASS INDEX DOCD: CPT

## 2025-06-03 PROCEDURE — 4010F ACE/ARB THERAPY RXD/TAKEN: CPT

## 2025-06-03 PROCEDURE — 3078F DIAST BP <80 MM HG: CPT

## 2025-06-03 RX ORDER — ONDANSETRON 4 MG/1
4 TABLET, ORALLY DISINTEGRATING ORAL EVERY 8 HOURS PRN
Qty: 20 TABLET | Refills: 2 | Status: SHIPPED | OUTPATIENT
Start: 2025-06-03 | End: 2025-07-03

## 2025-06-03 ASSESSMENT — ENCOUNTER SYMPTOMS
NEUROLOGICAL NEGATIVE: 1
HEMATOLOGIC/LYMPHATIC NEGATIVE: 1
LOSS OF SENSATION IN FEET: 0
CARDIOVASCULAR NEGATIVE: 1
ALLERGIC/IMMUNOLOGIC NEGATIVE: 1
GASTROINTESTINAL NEGATIVE: 1
FLANK PAIN: 1
RESPIRATORY NEGATIVE: 1
ENDOCRINE NEGATIVE: 1
DEPRESSION: 0
DYSPHORIC MOOD: 1
FATIGUE: 1
OCCASIONAL FEELINGS OF UNSTEADINESS: 0
MYALGIAS: 1
EYES NEGATIVE: 1

## 2025-06-03 ASSESSMENT — PROMIS GLOBAL HEALTH SCALE
RATE_AVERAGE_PAIN: 6
RATE_PHYSICAL_HEALTH: FAIR
RATE_MENTAL_HEALTH: FAIR
EMOTIONAL_PROBLEMS: OFTEN
RATE_GENERAL_HEALTH: FAIR
CARRYOUT_PHYSICAL_ACTIVITIES: MODERATELY
RATE_SOCIAL_SATISFACTION: GOOD
CARRYOUT_SOCIAL_ACTIVITIES: FAIR
RATE_AVERAGE_FATIGUE: SEVERE
RATE_QUALITY_OF_LIFE: GOOD

## 2025-06-03 ASSESSMENT — PATIENT HEALTH QUESTIONNAIRE - PHQ9
2. FEELING DOWN, DEPRESSED OR HOPELESS: SEVERAL DAYS
SUM OF ALL RESPONSES TO PHQ9 QUESTIONS 1 AND 2: 2
1. LITTLE INTEREST OR PLEASURE IN DOING THINGS: SEVERAL DAYS

## 2025-06-03 NOTE — PATIENT INSTRUCTIONS
I recommend trying Magnesium glycinate, Complex B vitamin and CoQ10.    I recommend started yoga daily, start with the beginners and go slow. Stretching daily, heating pad as needed to left side to help with pain.

## 2025-06-03 NOTE — PROGRESS NOTES
"Subjective   Patient ID: Denise Kurtz is a 39 y.o. female who presents for Follow-up (Follow up on pain/testing. Patient would like refill on tizanidine.).    Past Medical, Surgical, and Family History reviewed and updated in chart.     Reviewed all medications by prescribing practitioner or clinical pharmacist (such as prescriptions, OTCs, herbal therapies and supplements) and documented in the medical record.    HPI   39 yof in office for follow up.    Pain is getting worse and endorses always nauseous since last visit which is new.  States she feels like there is something I her left flank that she could just pull out.   All testing completed has abdi unremarkable.  Does have appointment with uro/madison.   MRI unremarkable.  SHWETA negative.    Patient endorses does nothing, works from home,  does house work. Lays around most days now and remains to have pain. The only thing to help is pain medication with heating pad to left side.     Review of Systems   Constitutional:  Positive for fatigue.   HENT: Negative.     Eyes: Negative.    Respiratory: Negative.     Cardiovascular: Negative.    Gastrointestinal: Negative.    Endocrine: Negative.    Genitourinary:  Positive for flank pain.   Musculoskeletal:  Positive for myalgias.   Skin: Negative.    Allergic/Immunologic: Negative.    Neurological: Negative.    Hematological: Negative.    Psychiatric/Behavioral:  Positive for dysphoric mood.    All other systems reviewed and are negative.      Objective   /75   Pulse 86   Ht 1.702 m (5' 7.01\")   Wt 106 kg (233 lb)   SpO2 96%   BMI 36.48 kg/m²     Physical Exam  Constitutional:       Appearance: Normal appearance.   HENT:      Head: Normocephalic and atraumatic.      Nose: Nose normal.      Mouth/Throat:      Mouth: Mucous membranes are moist.      Pharynx: Oropharynx is clear.   Eyes:      Pupils: Pupils are equal, round, and reactive to light.   Cardiovascular:      Rate and Rhythm: Normal rate and " regular rhythm.      Pulses: Normal pulses.      Heart sounds: Normal heart sounds.   Pulmonary:      Effort: Pulmonary effort is normal.      Breath sounds: Normal breath sounds.   Abdominal:      General: Bowel sounds are normal.      Palpations: Abdomen is soft.   Musculoskeletal:         General: Normal range of motion.      Cervical back: Normal range of motion.   Skin:     General: Skin is warm and dry.   Neurological:      General: No focal deficit present.      Mental Status: She is alert and oriented to person, place, and time.   Psychiatric:         Mood and Affect: Mood normal.         Behavior: Behavior normal.         Thought Content: Thought content normal.         Judgment: Judgment normal.         Assessment/Plan   Problem List Items Addressed This Visit       Chronic fatigue and malaise    Left flank pain    Musculoskeletal pain    Nausea - Primary    Relevant Medications    ondansetron ODT (Zofran-ODT) 4 mg disintegrating tablet     Other Visit Diagnoses         Health care maintenance        Relevant Orders    Follow Up In [facility] - PCP - Established          Discussed possible side effect of medications. Will discuss with endo tomorrow at appointment regarding semaglutide. Does endorse felt better with tirzepatde.  Lack of activity. Discussed yoga and light weight activity with good stretching. Seems likey MSK.    Will follow up in 3 months or sooner as needed.

## 2025-06-04 ENCOUNTER — APPOINTMENT (OUTPATIENT)
Dept: ENDOCRINOLOGY | Facility: CLINIC | Age: 40
End: 2025-06-04
Payer: COMMERCIAL

## 2025-06-04 VITALS
WEIGHT: 234 LBS | HEART RATE: 72 BPM | DIASTOLIC BLOOD PRESSURE: 82 MMHG | BODY MASS INDEX: 36.73 KG/M2 | SYSTOLIC BLOOD PRESSURE: 126 MMHG | HEIGHT: 67 IN

## 2025-06-04 DIAGNOSIS — E11.9 TYPE 2 DIABETES MELLITUS WITHOUT COMPLICATION, UNSPECIFIED WHETHER LONG TERM INSULIN USE: ICD-10-CM

## 2025-06-04 PROCEDURE — 4010F ACE/ARB THERAPY RXD/TAKEN: CPT | Performed by: INTERNAL MEDICINE

## 2025-06-04 PROCEDURE — 3074F SYST BP LT 130 MM HG: CPT | Performed by: INTERNAL MEDICINE

## 2025-06-04 PROCEDURE — 99213 OFFICE O/P EST LOW 20 MIN: CPT | Performed by: INTERNAL MEDICINE

## 2025-06-04 PROCEDURE — 3079F DIAST BP 80-89 MM HG: CPT | Performed by: INTERNAL MEDICINE

## 2025-06-04 PROCEDURE — 3008F BODY MASS INDEX DOCD: CPT | Performed by: INTERNAL MEDICINE

## 2025-06-04 RX ORDER — TIRZEPATIDE 7.5 MG/.5ML
7.5 INJECTION, SOLUTION SUBCUTANEOUS
Qty: 2 ML | Refills: 0 | Status: SHIPPED | OUTPATIENT
Start: 2025-06-04 | End: 2025-07-04

## 2025-06-04 ASSESSMENT — ENCOUNTER SYMPTOMS: HEADACHES: 1

## 2025-06-04 NOTE — PROGRESS NOTES
"Subjective   Denise Kurtz is a 39 y.o. female who presents for follow-up of Type 2 diabetes mellitus. The initial diagnosis of diabetes was made in 2013. She denies GDM with three children (two babies over 9 lbs)    She continues to have left sided abdominal pain.  PCP suggested that it may be due to Ozempic as workup this far has been unrevealing.      Known complications due to diabetes included obesity.      Cardiovascular risk factors include diabetes mellitus and obesity (BMI >= 30 kg/m2). The patient is on an ACE inhibitor or angiotensin II receptor blocker.  The patient has not been previously hospitalized due to diabetic ketoacidosis.     Current symptoms/problems include none. Her clinical course has been stable.     The patient is currently checking the blood glucose.      Patient is using: glucometer      Hypoglycemia frequency: Denies   Hypoglycemia awareness: Yes     Current Medication Regimen  Humulin U500 105 units in the morning  Ozempic 2mg SQ once weekly    She felt that she did better with Mounjaro.  She was switched to Ozempic last year due to drug unavailability.         Exercise regimen - not as active because of the pain     Family history  Father- type 2 DM     Review of Systems   Constitutional:  Positive for unexpected weight change (Weight gain).   Cardiovascular:  Positive for chest pain.   Gastrointestinal:  Positive for abdominal pain and nausea.   Genitourinary:  Positive for difficulty urinating.   Musculoskeletal:  Positive for arthralgias and myalgias.   Neurological:  Positive for headaches (No migraine in 3 years).   All other systems reviewed and are negative.    Objective   /82   Pulse 72   Ht 1.702 m (5' 7\")   Wt 106 kg (234 lb)   BMI 36.65 kg/m²   Physical Exam  Vitals and nursing note reviewed.   Constitutional:       General: She is not in acute distress.     Appearance: Normal appearance. She is obese.   HENT:      Head: Normocephalic and atraumatic.      Nose: " Nose normal.      Mouth/Throat:      Mouth: Mucous membranes are moist.   Eyes:      Extraocular Movements: Extraocular movements intact.   Pulmonary:      Effort: Pulmonary effort is normal.   Musculoskeletal:         General: Normal range of motion.   Neurological:      Mental Status: She is alert and oriented to person, place, and time.   Psychiatric:         Mood and Affect: Mood normal.         Lab Review  GLUCOSE (mg/dL)   Date Value   04/11/2025 143 (H)     Glucose (mg/dL)   Date Value   12/04/2024 87   09/11/2024 141 (H)   01/06/2024 123 (H)     POC HEMOGLOBIN A1c (%)   Date Value   06/20/2024 6.7 (A)   12/12/2023 6.7 (A)     HEMOGLOBIN A1c (% of total Hgb)   Date Value   04/11/2025 7.2 (H)     Hemoglobin A1C (%)   Date Value   12/04/2024 7.0 (H)   02/08/2023 8.4 (A)   10/31/2022 8.1 (A)   06/22/2022 9.0 (A)     CARBON DIOXIDE (mmol/L)   Date Value   04/11/2025 26     Bicarbonate (mmol/L)   Date Value   12/04/2024 26   09/11/2024 25   01/06/2024 24     UREA NITROGEN (BUN) (mg/dL)   Date Value   04/11/2025 16     Urea Nitrogen (mg/dL)   Date Value   12/04/2024 10   09/11/2024 11   01/06/2024 13     Creatinine (mg/dL)   Date Value   12/04/2024 0.68   09/11/2024 0.74   01/06/2024 0.94     CREATININE (mg/dL)   Date Value   04/11/2025 0.76     Lab Results   Component Value Date    CHOL 153 04/11/2025    CHOL 137 12/04/2024    CHOL 160 01/06/2024     Lab Results   Component Value Date    HDL 35 (L) 04/11/2025    HDL 30.2 12/04/2024    HDL 35.1 01/06/2024     Lab Results   Component Value Date    LDLCALC 82 04/11/2025    LDLCALC 64 12/04/2024    LDLCALC 80 01/06/2024     Lab Results   Component Value Date    TRIG 271 (H) 04/11/2025    TRIG 214 (H) 12/04/2024    TRIG 225 (H) 01/06/2024       Health Maintenance:   Foot Exam: June 20, 2023  Eye Exam:  July 11, 2022  Urine Albumin:  December 4, 2024    Assessment/Plan   39-year-old female presents for follow up for type 2 diabetes.  Her blood pressure is at goal today.  She is noted to be on a statin    Type 2 diabetes mellitus  To continue Humulin R U500 105 units SQ before breakfast  To discontinue Ozempic 2mg SQ weekly to see if pains go away   Will restart Mounjaro after 2-3 weeks at 7.5mg subcutaneous once weekly and then:  10 mg/0.5 mL for 4 weeks and then,  12.5 mg/0.5 mL for 4 weeks and then,  15 mg/0.5 mL  To obtain fasting blood and urine tests before the next appointment   For follow up in 4 months

## 2025-06-04 NOTE — PATIENT INSTRUCTIONS
Thank you for choosing [facility]  for your health care needs.  If you have any questions, concerns or medical needs, please feel free to contact our office at [phone].    Please ensure you complete your blood work one week before the next scheduled appointment.    To continue Humulin R U500 105 units SQ before breakfast  To discontinue Ozempic 2mg SQ weekly to see if pains go away   Will restart Mounjaro after 2-3 weeks at 7.5mg subcutaneous once weekly and then:  10 mg/0.5 mL for 4 weeks and then,  12.5 mg/0.5 mL for 4 weeks and then,  15 mg/0.5 mL  To obtain fasting blood and urine tests before the next appointment   For follow up in 4 months

## 2025-06-08 ASSESSMENT — ENCOUNTER SYMPTOMS
ARTHRALGIAS: 1
UNEXPECTED WEIGHT CHANGE: 1
MYALGIAS: 1
DIFFICULTY URINATING: 1
ABDOMINAL PAIN: 1
NAUSEA: 1

## 2025-06-08 NOTE — ASSESSMENT & PLAN NOTE
To continue Humulin R U500 105 units SQ before breakfast  To discontinue Ozempic 2mg SQ weekly to see if pains go away   Will restart Mounjaro after 2-3 weeks at 7.5mg subcutaneous once weekly and then:  10 mg/0.5 mL for 4 weeks and then,  12.5 mg/0.5 mL for 4 weeks and then,  15 mg/0.5 mL  To obtain fasting blood and urine tests before the next appointment   For follow up in 4 months

## 2025-06-10 ENCOUNTER — APPOINTMENT (OUTPATIENT)
Dept: UROLOGY | Facility: CLINIC | Age: 40
End: 2025-06-10
Payer: COMMERCIAL

## 2025-06-23 DIAGNOSIS — M79.18 MUSCULOSKELETAL PAIN: Primary | ICD-10-CM

## 2025-06-23 DIAGNOSIS — R10.9 LEFT FLANK PAIN: ICD-10-CM

## 2025-06-23 RX ORDER — HYDROCODONE BITARTRATE AND ACETAMINOPHEN 5; 325 MG/1; MG/1
1 TABLET ORAL EVERY 6 HOURS PRN
Qty: 12 TABLET | Refills: 0 | Status: SHIPPED | OUTPATIENT
Start: 2025-06-23 | End: 2025-06-23 | Stop reason: SDUPTHER

## 2025-06-23 RX ORDER — TIZANIDINE 2 MG/1
2 TABLET ORAL EVERY 6 HOURS PRN
Qty: 30 TABLET | Refills: 0 | Status: SHIPPED | OUTPATIENT
Start: 2025-06-23 | End: 2025-07-03

## 2025-06-23 RX ORDER — HYDROCODONE BITARTRATE AND ACETAMINOPHEN 5; 325 MG/1; MG/1
1 TABLET ORAL EVERY 6 HOURS PRN
Qty: 12 TABLET | Refills: 0 | Status: SHIPPED | OUTPATIENT
Start: 2025-06-23 | End: 2025-06-26

## 2025-06-24 ENCOUNTER — APPOINTMENT (OUTPATIENT)
Dept: UROLOGY | Facility: CLINIC | Age: 40
End: 2025-06-24
Payer: COMMERCIAL

## 2025-07-07 ENCOUNTER — PATIENT MESSAGE (OUTPATIENT)
Dept: ENDOCRINOLOGY | Facility: CLINIC | Age: 40
End: 2025-07-07

## 2025-07-14 DIAGNOSIS — E11.9 TYPE 2 DIABETES MELLITUS WITHOUT COMPLICATION, UNSPECIFIED WHETHER LONG TERM INSULIN USE: ICD-10-CM

## 2025-07-14 RX ORDER — TIRZEPATIDE 7.5 MG/.5ML
INJECTION, SOLUTION SUBCUTANEOUS
Qty: 2 ML | Refills: 11 | OUTPATIENT
Start: 2025-07-14

## 2025-07-15 ENCOUNTER — APPOINTMENT (OUTPATIENT)
Dept: UROLOGY | Facility: CLINIC | Age: 40
End: 2025-07-15
Payer: COMMERCIAL

## 2025-07-21 RX ORDER — TIRZEPATIDE 10 MG/.5ML
10 INJECTION, SOLUTION SUBCUTANEOUS
Qty: 6 ML | Refills: 0 | Status: SHIPPED | OUTPATIENT
Start: 2025-07-21 | End: 2025-08-20

## 2025-07-21 RX ORDER — TIRZEPATIDE 10 MG/.5ML
INJECTION, SOLUTION SUBCUTANEOUS
COMMUNITY
End: 2025-07-21 | Stop reason: SDUPTHER

## 2025-08-10 DIAGNOSIS — E11.9 TYPE 2 DIABETES MELLITUS WITHOUT COMPLICATION, UNSPECIFIED WHETHER LONG TERM INSULIN USE: ICD-10-CM

## 2025-08-11 RX ORDER — TIRZEPATIDE 10 MG/.5ML
INJECTION, SOLUTION SUBCUTANEOUS
Qty: 2 ML | Refills: 11 | OUTPATIENT
Start: 2025-08-17

## 2025-08-13 ENCOUNTER — TELEPHONE (OUTPATIENT)
Dept: PRIMARY CARE | Facility: CLINIC | Age: 40
End: 2025-08-13
Payer: COMMERCIAL

## 2025-08-15 DIAGNOSIS — M54.50 BACK PAIN, LUMBOSACRAL: Primary | ICD-10-CM

## 2025-08-15 RX ORDER — HYDROCODONE BITARTRATE AND ACETAMINOPHEN 5; 325 MG/1; MG/1
1 TABLET ORAL EVERY 6 HOURS PRN
Qty: 12 TABLET | Refills: 0 | Status: SHIPPED | OUTPATIENT
Start: 2025-08-15 | End: 2025-08-18

## 2025-08-18 DIAGNOSIS — E11.9 TYPE 2 DIABETES MELLITUS WITHOUT COMPLICATION, UNSPECIFIED WHETHER LONG TERM INSULIN USE: Primary | ICD-10-CM

## 2025-08-18 RX ORDER — TIRZEPATIDE 12.5 MG/.5ML
12.5 INJECTION, SOLUTION SUBCUTANEOUS
Qty: 2 ML | Refills: 0 | Status: SHIPPED | OUTPATIENT
Start: 2025-08-18 | End: 2025-09-17

## 2025-08-18 RX ORDER — TIRZEPATIDE 15 MG/.5ML
15 INJECTION, SOLUTION SUBCUTANEOUS
Qty: 2 ML | Refills: 5 | Status: SHIPPED | OUTPATIENT
Start: 2025-08-18 | End: 2026-02-14

## 2025-08-26 ENCOUNTER — APPOINTMENT (OUTPATIENT)
Dept: UROLOGY | Facility: CLINIC | Age: 40
End: 2025-08-26
Payer: COMMERCIAL

## 2025-08-26 DIAGNOSIS — R39.9 URINARY SYMPTOM OR SIGN: Primary | ICD-10-CM

## 2025-08-26 LAB
POC APPEARANCE, URINE: CLEAR
POC BILIRUBIN, URINE: NEGATIVE
POC BLOOD, URINE: NEGATIVE
POC COLOR, URINE: ABNORMAL
POC GLUCOSE, URINE: NEGATIVE MG/DL
POC KETONES, URINE: NEGATIVE MG/DL
POC LEUKOCYTES, URINE: NEGATIVE
POC NITRITE,URINE: NEGATIVE
POC PH, URINE: 7 PH
POC PROTEIN, URINE: NEGATIVE MG/DL
POC SPECIFIC GRAVITY, URINE: 1.01
POC UROBILINOGEN, URINE: 0.2 EU/DL

## 2025-08-26 PROCEDURE — 81002 URINALYSIS NONAUTO W/O SCOPE: CPT | Performed by: UROLOGY

## 2025-08-26 PROCEDURE — 99214 OFFICE O/P EST MOD 30 MIN: CPT | Performed by: UROLOGY

## 2025-08-26 PROCEDURE — 1036F TOBACCO NON-USER: CPT | Performed by: UROLOGY

## 2025-08-26 PROCEDURE — 4010F ACE/ARB THERAPY RXD/TAKEN: CPT | Performed by: UROLOGY

## 2025-08-27 ENCOUNTER — APPOINTMENT (OUTPATIENT)
Dept: SURGERY | Facility: CLINIC | Age: 40
End: 2025-08-27
Payer: COMMERCIAL

## 2025-08-27 VITALS
WEIGHT: 234 LBS | HEIGHT: 67 IN | SYSTOLIC BLOOD PRESSURE: 130 MMHG | OXYGEN SATURATION: 96 % | DIASTOLIC BLOOD PRESSURE: 88 MMHG | BODY MASS INDEX: 36.73 KG/M2 | HEART RATE: 87 BPM

## 2025-08-27 DIAGNOSIS — K64.4 SKIN TAGS, ANUS OR RECTUM: ICD-10-CM

## 2025-08-27 DIAGNOSIS — K62.0 ANAL POLYP: Primary | ICD-10-CM

## 2025-08-27 DIAGNOSIS — K59.09 CHRONIC CONSTIPATION: ICD-10-CM

## 2025-08-27 DIAGNOSIS — K64.8 HEMORRHOIDS, INTERNAL, WITH BLEEDING: ICD-10-CM

## 2025-08-27 PROCEDURE — 3075F SYST BP GE 130 - 139MM HG: CPT | Performed by: SURGERY

## 2025-08-27 PROCEDURE — 4010F ACE/ARB THERAPY RXD/TAKEN: CPT | Performed by: SURGERY

## 2025-08-27 PROCEDURE — 3079F DIAST BP 80-89 MM HG: CPT | Performed by: SURGERY

## 2025-08-27 PROCEDURE — 1036F TOBACCO NON-USER: CPT | Performed by: SURGERY

## 2025-08-27 PROCEDURE — 99204 OFFICE O/P NEW MOD 45 MIN: CPT | Performed by: SURGERY

## 2025-08-27 PROCEDURE — 3008F BODY MASS INDEX DOCD: CPT | Performed by: SURGERY

## 2025-08-27 ASSESSMENT — ENCOUNTER SYMPTOMS
WOUND: 0
CONFUSION: 0
HEMATURIA: 0
CONSTIPATION: 1
COUGH: 0
ABDOMINAL PAIN: 0
CHILLS: 0
FATIGUE: 0
SPEECH DIFFICULTY: 0
ARTHRALGIAS: 0
POLYPHAGIA: 0
WEAKNESS: 0
VOMITING: 0
DIARRHEA: 0
BRUISES/BLEEDS EASILY: 0
HEADACHES: 0
FEVER: 0
EYE PAIN: 0
EYE REDNESS: 0
FREQUENCY: 0
SHORTNESS OF BREATH: 0
NAUSEA: 0
MYALGIAS: 0
FLANK PAIN: 0
DYSURIA: 0
AGITATION: 0

## 2025-09-05 ENCOUNTER — APPOINTMENT (OUTPATIENT)
Dept: SURGERY | Facility: CLINIC | Age: 40
End: 2025-09-05
Payer: COMMERCIAL

## 2025-09-05 ASSESSMENT — ENCOUNTER SYMPTOMS
DYSURIA: 0
FEVER: 0
SPEECH DIFFICULTY: 0
CONSTIPATION: 1
COUGH: 0
POLYPHAGIA: 0
EYE REDNESS: 0
ABDOMINAL PAIN: 0
VOMITING: 0
FATIGUE: 0
EYE PAIN: 0
WEAKNESS: 0
AGITATION: 0
FREQUENCY: 0
DIARRHEA: 0
FLANK PAIN: 0
CONFUSION: 0
ARTHRALGIAS: 0
WOUND: 0
NAUSEA: 0
BRUISES/BLEEDS EASILY: 0
HEADACHES: 0
MYALGIAS: 0
HEMATURIA: 0
CHILLS: 0
SHORTNESS OF BREATH: 0

## 2025-09-09 ENCOUNTER — APPOINTMENT (OUTPATIENT)
Facility: CLINIC | Age: 40
End: 2025-09-09
Payer: COMMERCIAL

## 2025-09-12 ENCOUNTER — APPOINTMENT (OUTPATIENT)
Dept: SURGERY | Facility: CLINIC | Age: 40
End: 2025-09-12
Payer: COMMERCIAL

## 2025-09-22 ENCOUNTER — APPOINTMENT (OUTPATIENT)
Dept: UROLOGY | Facility: CLINIC | Age: 40
End: 2025-09-22
Payer: COMMERCIAL

## 2025-10-30 ENCOUNTER — APPOINTMENT (OUTPATIENT)
Dept: ENDOCRINOLOGY | Facility: CLINIC | Age: 40
End: 2025-10-30
Payer: COMMERCIAL

## 2025-12-08 ENCOUNTER — APPOINTMENT (OUTPATIENT)
Dept: PRIMARY CARE | Facility: CLINIC | Age: 40
End: 2025-12-08
Payer: COMMERCIAL